# Patient Record
Sex: MALE | Race: WHITE | NOT HISPANIC OR LATINO | URBAN - METROPOLITAN AREA
[De-identification: names, ages, dates, MRNs, and addresses within clinical notes are randomized per-mention and may not be internally consistent; named-entity substitution may affect disease eponyms.]

---

## 2017-01-02 ENCOUNTER — ANESTHESIA EVENT (OUTPATIENT)
Dept: SURGERY | Facility: CLINIC | Age: 27
DRG: 407 | End: 2017-01-02

## 2017-01-02 ENCOUNTER — TELEPHONE (OUTPATIENT)
Dept: TRANSPLANT | Facility: CLINIC | Age: 27
End: 2017-01-02

## 2017-01-02 NOTE — TELEPHONE ENCOUNTER
D:  Cb Martinez is a 26 year old single white male who is approved to go forward with living liver donation surgery on Wednesday, January 4, 2017.     I:  He called me today to ask some questions about his upcoming surgery and the logistics where care givers can wait etc.  A/P:  I answered Cb's questions about care givers and the process for having his caregivers updated in the family waiting room.  He wants his girlfriend and his parents to be the designated people who are allowed to get updates about his status.  I asked him to convey this information to the pre surgical prep team.  Cb states that he is feeling very comfortable and not having any second thoughts about being a living liver donor.  He has never been through surgery before, but he states that he feels confident in the team's ability.  Cb will be in clinic tomorrow for his day minus one appointments.  He is planning to have his mom, dad, and girlfriend take turns staying with him in the hospital.  I told Cb that I will visit him in the hospital post surgery.    EMMA Adkins, [facility]  Clinical  and Independent Donor Advocate  [facility]  Pager:  [phone]  Direct:  [phone]

## 2017-01-03 ENCOUNTER — APPOINTMENT (OUTPATIENT)
Dept: TRANSPLANT | Facility: CLINIC | Age: 27
End: 2017-01-03
Attending: TRANSPLANT SURGERY

## 2017-01-03 ENCOUNTER — OFFICE VISIT (OUTPATIENT)
Dept: SURGERY | Facility: CLINIC | Age: 27
End: 2017-01-03

## 2017-01-03 ENCOUNTER — OFFICE VISIT (OUTPATIENT)
Dept: TRANSPLANT | Facility: CLINIC | Age: 27
End: 2017-01-03
Attending: TRANSPLANT SURGERY

## 2017-01-03 ENCOUNTER — TELEPHONE (OUTPATIENT)
Dept: TRANSPLANT | Facility: CLINIC | Age: 27
End: 2017-01-03

## 2017-01-03 ENCOUNTER — DOCUMENTATION ONLY (OUTPATIENT)
Dept: TRANSPLANT | Facility: CLINIC | Age: 27
End: 2017-01-03

## 2017-01-03 VITALS
DIASTOLIC BLOOD PRESSURE: 73 MMHG | WEIGHT: 163 LBS | BODY MASS INDEX: 20.92 KG/M2 | RESPIRATION RATE: 16 BRPM | HEIGHT: 74 IN | HEART RATE: 57 BPM | OXYGEN SATURATION: 98 % | SYSTOLIC BLOOD PRESSURE: 117 MMHG | TEMPERATURE: 94.6 F

## 2017-01-03 VITALS
WEIGHT: 163 LBS | RESPIRATION RATE: 16 BRPM | SYSTOLIC BLOOD PRESSURE: 121 MMHG | DIASTOLIC BLOOD PRESSURE: 72 MMHG | TEMPERATURE: 98.2 F | OXYGEN SATURATION: 98 % | BODY MASS INDEX: 20.92 KG/M2 | HEIGHT: 74 IN | HEART RATE: 61 BPM

## 2017-01-03 DIAGNOSIS — Z00.5 TRANSPLANT DONOR EVALUATION: Primary | ICD-10-CM

## 2017-01-03 DIAGNOSIS — Z52.6 DONOR FOR LIVER TRANSPLANT: Primary | ICD-10-CM

## 2017-01-03 DIAGNOSIS — Z00.5 TRANSPLANT DONOR EVALUATION: ICD-10-CM

## 2017-01-03 LAB
ABO + RH BLD: NORMAL
ABO + RH BLD: NORMAL
ALBUMIN UR-MCNC: NEGATIVE MG/DL
ANION GAP SERPL CALCULATED.3IONS-SCNC: 6 MMOL/L (ref 3–14)
APPEARANCE UR: CLEAR
APTT PPP: 32 SEC (ref 22–37)
BILIRUB UR QL STRIP: NEGATIVE
BLD GP AB SCN SERPL QL: NORMAL
BLOOD BANK CMNT PATIENT-IMP: NORMAL
BUN SERPL-MCNC: 13 MG/DL (ref 7–30)
CALCIUM SERPL-MCNC: 9.1 MG/DL (ref 8.5–10.1)
CHLORIDE SERPL-SCNC: 105 MMOL/L (ref 94–109)
CO2 SERPL-SCNC: 29 MMOL/L (ref 20–32)
COLOR UR AUTO: COLORLESS
CREAT SERPL-MCNC: 0.9 MG/DL (ref 0.66–1.25)
ERYTHROCYTE [DISTWIDTH] IN BLOOD BY AUTOMATED COUNT: 11.6 % (ref 10–15)
GFR SERPL CREATININE-BSD FRML MDRD: NORMAL ML/MIN/1.7M2
GLUCOSE SERPL-MCNC: 92 MG/DL (ref 70–99)
GLUCOSE UR STRIP-MCNC: NEGATIVE MG/DL
HCT VFR BLD AUTO: 45.3 % (ref 40–53)
HGB BLD-MCNC: 16.2 G/DL (ref 13.3–17.7)
HGB UR QL STRIP: NEGATIVE
INR PPP: 1.05 (ref 0.86–1.14)
KETONES UR STRIP-MCNC: NEGATIVE MG/DL
LEUKOCYTE ESTERASE UR QL STRIP: NEGATIVE
MCH RBC QN AUTO: 31.8 PG (ref 26.5–33)
MCHC RBC AUTO-ENTMCNC: 35.8 G/DL (ref 31.5–36.5)
MCV RBC AUTO: 89 FL (ref 78–100)
MUCOUS THREADS #/AREA URNS LPF: PRESENT /LPF
NITRATE UR QL: NEGATIVE
PH UR STRIP: 6 PH (ref 5–7)
PLATELET # BLD AUTO: 212 10E9/L (ref 150–450)
POTASSIUM SERPL-SCNC: 3.9 MMOL/L (ref 3.4–5.3)
RBC # BLD AUTO: 5.09 10E12/L (ref 4.4–5.9)
RBC #/AREA URNS AUTO: <1 /HPF (ref 0–2)
SODIUM SERPL-SCNC: 139 MMOL/L (ref 133–144)
SP GR UR STRIP: 1 (ref 1–1.03)
SPECIMEN EXP DATE BLD: NORMAL
URN SPEC COLLECT METH UR: ABNORMAL
UROBILINOGEN UR STRIP-MCNC: 0 MG/DL (ref 0–2)
WBC # BLD AUTO: 5.1 10E9/L (ref 4–11)
WBC #/AREA URNS AUTO: <1 /HPF (ref 0–2)

## 2017-01-03 PROCEDURE — 87086 URINE CULTURE/COLONY COUNT: CPT | Performed by: TRANSPLANT SURGERY

## 2017-01-03 PROCEDURE — 36415 COLL VENOUS BLD VENIPUNCTURE: CPT | Performed by: TRANSPLANT SURGERY

## 2017-01-03 PROCEDURE — 85610 PROTHROMBIN TIME: CPT | Performed by: TRANSPLANT SURGERY

## 2017-01-03 PROCEDURE — 86900 BLOOD TYPING SEROLOGIC ABO: CPT | Performed by: TRANSPLANT SURGERY

## 2017-01-03 PROCEDURE — 85027 COMPLETE CBC AUTOMATED: CPT | Performed by: TRANSPLANT SURGERY

## 2017-01-03 PROCEDURE — 80048 BASIC METABOLIC PNL TOTAL CA: CPT | Performed by: TRANSPLANT SURGERY

## 2017-01-03 PROCEDURE — 85730 THROMBOPLASTIN TIME PARTIAL: CPT | Performed by: TRANSPLANT SURGERY

## 2017-01-03 PROCEDURE — 86850 RBC ANTIBODY SCREEN: CPT | Performed by: TRANSPLANT SURGERY

## 2017-01-03 PROCEDURE — 86901 BLOOD TYPING SEROLOGIC RH(D): CPT | Performed by: TRANSPLANT SURGERY

## 2017-01-03 PROCEDURE — 81001 URINALYSIS AUTO W/SCOPE: CPT | Performed by: TRANSPLANT SURGERY

## 2017-01-03 RX ORDER — ERGOCALCIFEROL (VITAMIN D2) 10 MCG
400 TABLET ORAL PRN
COMMUNITY
End: 2017-01-20

## 2017-01-03 ASSESSMENT — PAIN SCALES - GENERAL: PAINLEVEL: NO PAIN (0)

## 2017-01-03 NOTE — H&P
Pre-Operative H & P     Date of Encounter: 1/3/2017  Primary Care Physician:  Unknown, Doctor    CC: Liver transplant donor to unrelated recipient.    HPI:  Rustam Martinez is a 26 year old male who presents for pre-operative H & P in preparation for Liver Transplant Living Unrelated Donor on 1/4/17 with Dr. Merchant at [facility].     The patient saw Dr. Merchant on 10/13/16 for evaluation as a potential liver transplant donor.  He has been fully evaluated, and his case has been reviewed by the Abdominal Transplant Committee.  He has been approved to donate, and arrangements are now being made for the above procedure.    History is obtained from the patient and the medical record.    Past Medical History:  Past Medical History   Diagnosis Date     NO ACTIVE PROBLEMS      Past Surgical History:  Past Surgical History   Procedure Laterality Date     Dental surgery       [place] teeth removal     Hx of Blood transfusions/reactions: Denies.     Hx of abnormal bleeding or anti-platelet use: Denies.    Menstrual history: No LMP for male patient.    Steroid use in the last year: Denies.    Personal or FH of difficulty with anesthesia:  Denies.    Prior to admission medications  Current Outpatient Prescriptions   Medication Sig Dispense Refill     Vitamin D, Cholecalciferol, 400 UNITS TABS Take 400 Units by mouth as needed (seasonal)       Allergies  No Known Allergies    Social History  Social History     Social History     Marital Status: Single     Spouse Name: N/A     Number of Children: N/A     Years of Education: N/A     Occupational History     Not on file.     Social History Main Topics     Smoking status: Never Smoker      Smokeless tobacco: Never Used     Alcohol Use: 0.0 oz/week     0 Standard drinks or equivalent per week      Comment: none for 1 month     Drug Use: No     Sexual Activity: Not on file     Other Topics Concern     Not on file     Social  "History Narrative    Single.  Paints houses.  Active in dancing and theater. 10/13/16     Family History  Family History   Problem Relation Age of Onset     Cirrhosis Maternal Grandfather      Review of Systems  Functional status: Independent in ADL's.  >4 METS.     The complete review of systems is negative other than noted in the HPI or here.   Constitutional: Denies recent changes in weight, sleeping patterns, or fevers/chills.  Eyes: Glasses for vision correction.  No recent vision changes.  EENT: Denies recent changes in hearing, mouth pain, or difficulty swallowing.  Cardiovascular: Denies chest pain, THOMPSON or orthopnea, or palpitations.  Respiratory: Denies shortness of breath or significant cough.    GI: Denies nausea/vomiting or diarrhea/constipation.    : Denies dysuria.    Musculoskeletal: Denies joint pain or swelling.    Skin: Denies rashes or wounds.    Hematologic: Denies easy bruising or bleeding.    Neurologic: Denies migraines, seizures, dizziness, numbness/tingling.  Psychiatric: Denies changes in mood or affect.      /72 mmHg  Pulse 61  Temp(Src) 98.2  F (36.8  C) (Oral)  Resp 16  Ht 1.88 m (6' 2\")  Wt 73.936 kg (163 lb)  BMI 20.92 kg/m2  SpO2 98%    163 lbs 0 oz  6' 2\"   Body mass index is 20.92 kg/(m^2).    Physical Exam  Constitutional: Patient awake, seated upright in a chair, in no apparent distress.  Appears stated age.  Eyes: Pupils equal, round and reactive to light.  Extra ocular muscles intact. Sclera clear.  Conjunctiva normal.  HENT: Head normocephalic.  Oral pharynx intact with moist mucous membranes.  Dentition intact.  No thyromegaly appreciated.   Respiratory: Lung sounds clear to auscultation bilaterally.  No rales, rhonchi, or wheezing noted.    Cardiovascular: S1, S2, regular rate and rhythm.  No murmurs, rubs, or gallops noted. Radial and pedal pulses palpable, bilaterally.  No edema noted.   GI: Bowel sounds present.  Abdomen flat, soft, non-tender to light " palpation.  No hepatosplenomegaly or masses palpated.    Genitourinary: Exam deferred.  Lymph/Hematologic: No cervical or supraclavicular lymphadenopathy noted.  No excessive bruising noted.    Skin: Color appropriate for race, warm, dry.  No rashes or wounds at anticipated surgical site.   Musculoskeletal: Full extension of the neck.  No redness, warmth, or swelling of the joints noted. Gross motor strength is normal.    Neurologic: Alert, oriented to name, place and time. Cranial nerves II-XII are grossly intact. Gait is normal.      Neuropsychiatric: Calm, cooperative. Normal affect.     Labs:  1/3/17: WBC 5.1; Hgb 162.; Hct 45.3; Plt 212; INR 1.05  1/3/17: Na 139; K 3.9; Cl 105; Glu 92; BUN 13; Cr 0.90; Ca 9.1    Imaging:  10/20/16 CTA Angiogram Abdomen Pelvis:  IMPRESSION:     1. Total liver volume is 1710.3 cc. The right lobe volume 972.7 cc, left lobe 708.0 cc, and caudate 29.6 cc.  2. Hepatic venous drainage: Right hepatic vein drains segments VI and VII, middle hepatic vein drains segments V, VIII, IV A, and IV B, and left hepatic vein drains segments II and III.  3. There are no accessory hepatic veins.  4. There is normal branching pattern of the portal veins.  5. Hepatic arterial anatomy: The left hepatic artery branches off of the common hepatic artery, before the origin of the gastroduodenal artery.  6. Liver parenchyma is normal.  7. Mild sludge in the gallbladder.     1/3/17 EKG: Personally reviewed and interpreted as sinus bradycardia.  Formal cardiology read pending.    1/3/17 CXR:   Impression:  No acute cardiopulmonary abnormality.    Lab results, EKG were personally reviewed by this provider.      Assessment and Plan  Rustam Martinez is a 26 year old male scheduled to undergo Liver Transplant Living Unrelated Donor on 1/4/17 with Dr. Merchant.    He has the following specific operative considerations:   The patient is a young, very healthy, physically active, non-smoking male who is  preparing to donate part of his liver.  He has had a complete work-up as part of the transplant process.  No further evaluation is needed.    Revised Cardiac Risk Index: 0.9% risk of major adverse cardiac event.  Anesthesia considerations: Refer to PAC assessment in the anesthesia records.  VTE risk: 0.5%  GLORIA risk: Low  PONV risk score= 1.  (If > 2, anti-emetic intervention is recommended.)    Romina Flores NP  Preoperative Assessment Center  [facility]  Phone: [phone]  Fax: [phone]

## 2017-01-03 NOTE — Clinical Note
1/3/2017       RE: Rustam Martinez  [address]  [facility] [address]     Dear Colleague,    Thank you for referring your patient, Rustam Martinez, to the [facility] SOLID ORGAN TRANSPLANT at [facility]. Please see a copy of my visit note below.        Pre Op Teaching Flowsheet       Pre and Post op Patient Education  Relevant Diagnosis:  Day -1 liver donor  Teaching Topic:  Pre and post op teaching  Person Involved in teaching:  Rustam Martinez     Motivation Level:  Asks Questions: Yes  Eager to Learn:  Yes  Cooperative: Yes  Receptive (willing/able to accept information):  Yes  Patient demonstrates understanding of the following:  Date and time of surgery:  1/04/2017  Location of surgery:  [address] [place] [address]  History and Physical and any other testing necessary prior to surgery: Yes  Required time line for completion of History and Physical and any pre-op testing: Yes    NPO Guidelines: NPO after midnight    Patient demonstrates understanding of the following:  Pre-op bowel prep:  Yes  Pre-op showering/scrub information with PCMX Soap: Yes  Medications to take the day of surgery:  Per PCP  Blood thinner medications discussed and when to stop (if applicable):  n/a  Diabetes medication management (if applicable):  N/A  Discussed pain control after surgery: pain medications and PCA pump  Infection Prevention: Patient demonstrates understanding of the following:  Patient instructed on hand hygiene:  Yes  Surgical procedure site care taught: at time of discharge  Signs and symptoms of infection taught:  Yes  Wound care will be taught at the time of discharge.  Central venous catheter care will be taught at the time of discharge (if applicable).    Post-op follow-up:  Discussed how to contact the hospital, nurse, and clinic scheduling staff if necessary.    Instructional materials used/given/mailed:  [facility] Booklet, post op teaching sheet, Map, Soap, and  arrival/location information.    Surgical instructions mailed to patient N/A.    Patient attended all appointments and completed all scheduled tests.  Patient to follow up with Transplant Coordinator.  Patient stated understanding and has contact numbers.            Again, thank you for allowing me to participate in the care of your patient.      Sincerely,    Transplant Evaluation Resource

## 2017-01-03 NOTE — ADDENDUM NOTE
Addendum  created 01/03/17 1736 by Diana Ko MD    Modules edited: Notes Section    Notes Section:  File: [id number]

## 2017-01-03 NOTE — TELEPHONE ENCOUNTER
Tyler Cody,  I m looking forward to the 4th and I have a few questions that either a) I have or b) people have been asking me that I don t know how to answer. So here goes:  --I m really hoping that I can get copies of all of my medical papers, the EKGs, the MRI, the X-rays, all the charts and bits and pieces of paper that have been collected about me regarding liver donation thus far. My little brother is an artist, and wants to include some of the material in a piece he ll be presenting in [place] this coming Spring. Is there any way that can happen?  --I have the schedule, but I m a little lost when it comes to telling my parents and loved ones when they should be there, and what they can expect at what time. Like, when I actually get put under, is it appropriate for my parents to be there? What time does that happen? Or do loved ones usually go to the testing and teaching fest on the 3rd? The Michelle hasn't spent much time in hospitals, so we don't really know what we're doing.  --My partner will be taking shifts as a caretaker for me, both in the hospital and after. I d really like to make sure she has the proper rights for medical disclosure or whatever it takes to make her an effective part of the team. What sort of information do you need to make this happen? Is it as simple as adding her name to a list or do you need something more?  --How should I talk to people about visiting me in the hospital? I ve never needed to be in a hospital for any length of time, so I have no idea what to say to people.  --My cousin is a Nurse Practitioner down in [place] and asked if she could watch (part of) the operation. Could that happen? What can I do to make it happen? I have no idea what the proper channels are for something like this, but if it will help for someone to correspond with her directly I will happily provide her contact information. I m sorry that this is so last minute, but we only just saw her as we were down  there for the holidays and it never even occurred to me before then.  I don't know if you have all the answers to all these questions--if some other member of the team is better suited to answer some of these, feel free to tell me who and I can contact them myself.  I think that s it for now. Jeison and I have been meeting and getting to know one another. We re both excited and ready for the 4th. Hope you re having an awesome day.

## 2017-01-03 NOTE — PROGRESS NOTES
Pre Op Teaching Flowsheet       Pre and Post op Patient Education  Relevant Diagnosis:  Day -1 liver donor  Teaching Topic:  Pre and post op teaching  Person Involved in teaching:  Rustam Martinez     Motivation Level:  Asks Questions: Yes  Eager to Learn:  Yes  Cooperative: Yes  Receptive (willing/able to accept information):  Yes  Patient demonstrates understanding of the following:  Date and time of surgery:  1/04/2017  Location of surgery:  [address] [place] [address]  History and Physical and any other testing necessary prior to surgery: Yes  Required time line for completion of History and Physical and any pre-op testing: Yes    NPO Guidelines: NPO after midnight    Patient demonstrates understanding of the following:  Pre-op bowel prep:  Yes  Pre-op showering/scrub information with PCMX Soap: Yes  Medications to take the day of surgery:  Per PCP  Blood thinner medications discussed and when to stop (if applicable):  n/a  Diabetes medication management (if applicable):  N/A  Discussed pain control after surgery: pain medications and PCA pump  Infection Prevention: Patient demonstrates understanding of the following:  Patient instructed on hand hygiene:  Yes  Surgical procedure site care taught: at time of discharge  Signs and symptoms of infection taught:  Yes  Wound care will be taught at the time of discharge.  Central venous catheter care will be taught at the time of discharge (if applicable).    Post-op follow-up:  Discussed how to contact the hospital, nurse, and clinic scheduling staff if necessary.    Instructional materials used/given/mailed:  [facility] Booklet, post op teaching sheet, Map, Soap, and arrival/location information.    Surgical instructions mailed to patient N/A.    Patient attended all appointments and completed all scheduled tests.  Patient to follow up with Transplant Coordinator.  Patient stated understanding and has contact numbers.

## 2017-01-03 NOTE — PROGRESS NOTES
Saw Cb in clinic for pre-op.  Surgery is scheduled for 1-4-17.  I reviewed expectations for day of surgery.  I thanked donor for their gift. I reviewed the importance of donor follow up.   I gave the donor parking passes and donor blanket.  I answered all questions.

## 2017-01-03 NOTE — Clinical Note
1/3/2017       RE: Rustam Martinez  [address]  [facility] [address]     Dear Colleague,    Thank you for referring your patient, Rustam Martinez, to the [facility] SOLID ORGAN TRANSPLANT at [facility]. Please see a copy of my visit note below.    [facility] H&P Transplant Note    Rustam Martinez MRN# [id number]   Age: 26 year old YOB: 1990     Date of Admission:  (Not on file)                    Assessment and Plan:   Assessment:    Patient Active Problem List   Diagnosis     NO ACTIVE PROBLEMS       Patient is seen here prior to living donor hepatectomy  Plan:   Living donor right hepatectomy  Informed consent in chart  Orders done in EPIC             Chief Complaint:   No complaints- healthy living donor liver     History is obtained from the patient         History of Present Illness:   This patient is a 26 year old  male without a significant past medical history who is seen in the clinic prior to living donor right hepatectomy for donation to a friend               Past Medical History:     Past Medical History   Diagnosis Date     NO ACTIVE PROBLEMS              Past Surgical History:     Past Surgical History   Procedure Laterality Date     No history of surgery               Social History:     Social History   Substance Use Topics     Smoking status: Never Smoker      Smokeless tobacco: Never Used      Comment: only a one time ciggerate     Alcohol Use: 0.0 oz/week     0 Standard drinks or equivalent per week             Family History:     Family History   Problem Relation Age of Onset     Cirrhosis Maternal Grandfather      Family history reviewed and updated in EPIC         Allergies:   This patient is allergic to has No Known Allergies.          Medications:     No current outpatient prescriptions on file.     No current facility-administered medications for this visit.           Immunosuppressant Medications:     "none        Reasons for Consult:   Evaluation for liver transplation         Organs Lab:   Hepatic Panel and INR         Review of Systems:   C: NEGATIVE for fever, chills, change in weight  E/M: NEGATIVE for ear, mouth and throat problems  R: NEGATIVE for significant cough or SOB  CV: NEGATIVE for chest pain, palpitations or peripheral edema  GI: NEGATIVE for nausea, abdominal pain, heartburn, or change in bowel habits  : negative for dysuria, hematuria, decreased urinary stream, erectile dysfunction  MUSCULOSKELETAL: NEGATIVE for significant arthralgias or myalgia  ROS otherwise negative          Physical Exam:   Vitals were reviewed                     Constitutional:   awake, alert, cooperative, no apparent distress, and appears stated age     Lungs:   No increased work of breathing, good air exchange, clear to auscultation bilaterally, no crackles or wheezing     Cardiovascular:   Normal apical impulse, regular rate and rhythm, normal S1 and S2, no S3 or S4, and no murmur noted     Abdomen:   No scars, normal bowel sounds, soft, non-distended, non-tender, no masses palpated, no hepatosplenomegaly, no ascites, narrow costal angle     Musculoskeletal:   There is no redness, warmth, or swelling of the joints.  Full range of motion noted.  Motor strength is 5 out of 5 all extremities bilaterally.  Tone is normal.     Ht- 6'2\"  Wt- 73 kg  BMI-21    Ct angio- normal hepatic artery branches  MRI- normal bile duct anatomy  Patent portal vein  Right lobe volume-972 cc total volume - 1710 cc.         Data:   All laboratory data reviewed  All cardiac studies reviewed by me.  All imaging studies reviewed by me.     Ana Haney M.D,   Transplant Fellow,  Pager # [phone].              Again, thank you for allowing me to participate in the care of your patient.      Sincerely,    Jose Merchant MD      "

## 2017-01-03 NOTE — Clinical Note
Date:January 9, 2017      Patient was self referred, no letter generated. Do not send.        [facility] Information

## 2017-01-03 NOTE — PROGRESS NOTES
[facility] H&P Transplant Note    Rustam Martinez MRN# [id number]   Age: 26 year old YOB: 1990     Date of Admission:  (Not on file)                    Assessment and Plan:   Assessment:    Patient Active Problem List   Diagnosis     NO ACTIVE PROBLEMS       Patient is seen here prior to living donor hepatectomy  Plan:   Living donor right hepatectomy  Informed consent in chart  Orders done in EPIC             Chief Complaint:   No complaints- healthy living donor liver     History is obtained from the patient         History of Present Illness:   This patient is a 26 year old  male without a significant past medical history who is seen in the clinic prior to living donor right hepatectomy for donation to a friend               Past Medical History:     Past Medical History   Diagnosis Date     NO ACTIVE PROBLEMS              Past Surgical History:     Past Surgical History   Procedure Laterality Date     No history of surgery               Social History:     Social History   Substance Use Topics     Smoking status: Never Smoker      Smokeless tobacco: Never Used      Comment: only a one time ciggerate     Alcohol Use: 0.0 oz/week     0 Standard drinks or equivalent per week             Family History:     Family History   Problem Relation Age of Onset     Cirrhosis Maternal Grandfather      Family history reviewed and updated in EPIC         Allergies:   This patient is allergic to has No Known Allergies.          Medications:     No current outpatient prescriptions on file.     No current facility-administered medications for this visit.           Immunosuppressant Medications:    none        Reasons for Consult:   Evaluation for liver transplation         Organs Lab:   Hepatic Panel and INR         Review of Systems:   C: NEGATIVE for fever, chills, change in weight  E/M: NEGATIVE for ear, mouth and throat problems  R: NEGATIVE for significant cough or SOB  CV: NEGATIVE for  "chest pain, palpitations or peripheral edema  GI: NEGATIVE for nausea, abdominal pain, heartburn, or change in bowel habits  : negative for dysuria, hematuria, decreased urinary stream, erectile dysfunction  MUSCULOSKELETAL: NEGATIVE for significant arthralgias or myalgia  ROS otherwise negative          Physical Exam:   Vitals were reviewed                     Constitutional:   awake, alert, cooperative, no apparent distress, and appears stated age     Lungs:   No increased work of breathing, good air exchange, clear to auscultation bilaterally, no crackles or wheezing     Cardiovascular:   Normal apical impulse, regular rate and rhythm, normal S1 and S2, no S3 or S4, and no murmur noted     Abdomen:   No scars, normal bowel sounds, soft, non-distended, non-tender, no masses palpated, no hepatosplenomegaly, no ascites, narrow costal angle     Musculoskeletal:   There is no redness, warmth, or swelling of the joints.  Full range of motion noted.  Motor strength is 5 out of 5 all extremities bilaterally.  Tone is normal.     Ht- 6'2\"  Wt- 73 kg  BMI-21    Ct angio- normal hepatic artery branches  MRI- normal bile duct anatomy  Patent portal vein  Right lobe volume-972 cc total volume - 1710 cc.         Data:   All laboratory data reviewed  All cardiac studies reviewed by me.  All imaging studies reviewed by me.     Ana Haney M.D,   Transplant Fellow,  Pager # [phone].          "

## 2017-01-03 NOTE — ANESTHESIA PREPROCEDURE EVALUATION
Anesthesia Evaluation     . Pt has had prior anesthetic. Type: MAC    No history of anesthetic complications     ROS/MED HX    ENT/Pulmonary:  - neg pulmonary ROS     Neurologic:  - neg neurologic ROS     Cardiovascular:  - neg cardiovascular ROS       METS/Exercise Tolerance:  >4 METS   Hematologic:  - neg hematologic  ROS       Musculoskeletal:  - neg musculoskeletal ROS       GI/Hepatic:  - neg GI/hepatic ROS       Renal/Genitourinary:  - ROS Renal section negative       Endo:  - neg endo ROS       Psychiatric: Comment: Depression is not currently an issue - neg psychiatric ROS   (+) psychiatric history depression      Infectious Disease:  - neg infectious disease ROS       Malignancy:      - no malignancy   Other:    - neg other ROS           Physical Exam  Normal systems: dental    Airway   Mallampati: I  TM distance: >3 FB  Neck ROM: full    Dental     Cardiovascular   Rhythm and rate: regular and normal  (-) no peripheral edema    Pulmonary    breath sounds clear to auscultation    Other findings: 1/3/17: WBC 5.1; Hgb 162.; Hct 45.3; Plt 212; INR 1.05  1/3/17: Na 139; K 3.9; Cl 105; Glu 92; BUN 13; Cr 0.90; Ca 9.1    10/20/16 CTA Angiogram Abdomen Pelvis:  IMPRESSION:     1. Total liver volume is 1710.3 cc. The right lobe volume 972.7 cc, left lobe 708.0 cc, and caudate 29.6 cc.  2. Hepatic venous drainage: Right hepatic vein drains segments VI and VII, middle hepatic vein drains segments V, VIII, IV A, and IV B, and left hepatic vein drains segments II and III.  3. There are no accessory hepatic veins.  4. There is normal branching pattern of the portal veins.  5. Hepatic arterial anatomy: The left hepatic artery branches off of the common hepatic artery, before the origin of the gastroduodenal artery.  6. Liver parenchyma is normal.  7. Mild sludge in the gallbladder.     1/3/17 EKG: Personally reviewed and interpreted as sinus bradycardia.  Formal cardiology read pending.    1/3/17 CXR:   Impression:  No  acute cardiopulmonary abnormality.             PAC Discussion and Assessment    ASA Classification: 1  Case is suitable for: [facility]  Anesthetic techniques and relevant risks discussed: GA and GA with regional block for post-op pain control  Invasive monitoring and risk discussed: Yes  Types:   Possibility and Risk of blood transfusion discussed: Yes  NPO instructions given:   Additional anesthetic preparation and risks discussed:   Needs early admission to pre-op area:   Other:     PAC Resident/NP Anesthesia Assessment:  Rustam Martinez is a 26 year old male scheduled to undergo Liver Transplant Living Unrelated Donor on 1/4/17 with Dr. Merchant.    He has the following specific operative considerations:   The patient is a young, very healthy, physically active, non-smoking male who is preparing to donate part of his liver.  He has had a complete work-up as part of the transplant process.  No further evaluation is needed.    Revised Cardiac Risk Index: 0.9% risk of major adverse cardiac event.  Anesthesia considerations: Refer to PAC assessment in the anesthesia records.  VTE risk: 0.5%  GLORIA risk: Low  PONV risk score= 1.  (If > 2, anti-emetic intervention is recommended.)      Reviewed and Signed by PAC Mid-Level Provider/Resident  Mid-Level Provider/Resident: Romina Flores CNP  Date: 1/3/17  Time: 1550    Attending Anesthesiologist Anesthesia Assessment:  Healthy 26 year old for partial hepatic donation. Patient/case discussed with NARDA. No need to see patient. Patient is appropriate for the planned procedure without further work-up or medical management.    Reviewed and Signed by PAC Anesthesiologist  Anesthesiologist: Diana Ko MD  Date: 1/3/2017  Time:   Pass/Fail: Pass  Disposition:     PAC Pharmacist Assessment:        Pharmacist:   Date:   Time:      Anesthesia Plan      History & Physical Review      ASA Status:  1 .        Plan for General, ETT and Periph. Nerve Block for postop pain with  Intravenous and Propofol induction. Maintenance will be Balanced.    PONV prophylaxis:  Ondansetron (or other 5HT-3)  Additional equipment: 2nd IV and Central Line      Postoperative Care  Postoperative pain management:  Multi-modal analgesia.      Consents                          .

## 2017-01-04 ENCOUNTER — ANESTHESIA (OUTPATIENT)
Dept: SURGERY | Facility: CLINIC | Age: 27
DRG: 407 | End: 2017-01-04

## 2017-01-04 ENCOUNTER — APPOINTMENT (OUTPATIENT)
Dept: GENERAL RADIOLOGY | Facility: CLINIC | Age: 27
DRG: 407 | End: 2017-01-04
Attending: TRANSPLANT SURGERY

## 2017-01-04 ENCOUNTER — APPOINTMENT (OUTPATIENT)
Dept: GENERAL RADIOLOGY | Facility: CLINIC | Age: 27
DRG: 407 | End: 2017-01-04
Attending: ANESTHESIOLOGY

## 2017-01-04 ENCOUNTER — APPOINTMENT (OUTPATIENT)
Dept: ULTRASOUND IMAGING | Facility: CLINIC | Age: 27
DRG: 407 | End: 2017-01-04
Attending: SURGERY

## 2017-01-04 PROBLEM — Z52.6 DONOR FOR LIVER TRANSPLANT: Status: ACTIVE | Noted: 2017-01-04

## 2017-01-04 LAB
BACTERIA SPEC CULT: NO GROWTH
INTERPRETATION ECG - MUSE: NORMAL
Lab: NORMAL
MICRO REPORT STATUS: NORMAL
SPECIMEN SOURCE: NORMAL

## 2017-01-04 PROCEDURE — 40000940 XR ABDOMEN PORT F1 VW

## 2017-01-04 PROCEDURE — 27110038 ZZH RX 271: Performed by: ANESTHESIOLOGY

## 2017-01-04 PROCEDURE — P9041 ALBUMIN (HUMAN),5%, 50ML: HCPCS | Performed by: NURSE ANESTHETIST, CERTIFIED REGISTERED

## 2017-01-04 PROCEDURE — 25000125 ZZHC RX 250: Performed by: NURSE ANESTHETIST, CERTIFIED REGISTERED

## 2017-01-04 PROCEDURE — 25800025 ZZH RX 258: Performed by: ANESTHESIOLOGY

## 2017-01-04 PROCEDURE — 25000125 ZZHC RX 250: Performed by: SURGERY

## 2017-01-04 PROCEDURE — C9399 UNCLASSIFIED DRUGS OR BIOLOG: HCPCS | Performed by: NURSE ANESTHETIST, CERTIFIED REGISTERED

## 2017-01-04 PROCEDURE — 40000940 XR CHEST PORT 1 VW

## 2017-01-04 PROCEDURE — 40000277 XR SURGERY CARM FLUORO LESS THAN 5 MIN W STILLS: Mod: TC

## 2017-01-04 PROCEDURE — 25000128 H RX IP 250 OP 636: Performed by: NURSE ANESTHETIST, CERTIFIED REGISTERED

## 2017-01-04 PROCEDURE — 25000125 ZZHC RX 250

## 2017-01-04 PROCEDURE — 25000125 ZZHC RX 250: Performed by: ANESTHESIOLOGY

## 2017-01-04 PROCEDURE — 93975 VASCULAR STUDY: CPT | Mod: TC

## 2017-01-04 RX ORDER — LIDOCAINE HYDROCHLORIDE AND EPINEPHRINE 15; 5 MG/ML; UG/ML
INJECTION, SOLUTION EPIDURAL PRN
Status: DISCONTINUED | OUTPATIENT
Start: 2017-01-04 | End: 2017-01-04

## 2017-01-04 RX ORDER — ESMOLOL HYDROCHLORIDE 10 MG/ML
INJECTION INTRAVENOUS PRN
Status: DISCONTINUED | OUTPATIENT
Start: 2017-01-04 | End: 2017-01-04

## 2017-01-04 RX ORDER — ALBUMIN, HUMAN INJ 5% 5 %
SOLUTION INTRAVENOUS CONTINUOUS PRN
Status: DISCONTINUED | OUTPATIENT
Start: 2017-01-04 | End: 2017-01-04

## 2017-01-04 RX ORDER — PROPOFOL 10 MG/ML
INJECTION, EMULSION INTRAVENOUS PRN
Status: DISCONTINUED | OUTPATIENT
Start: 2017-01-04 | End: 2017-01-04

## 2017-01-04 RX ORDER — HEPARIN SODIUM 1000 [USP'U]/ML
INJECTION, SOLUTION INTRAVENOUS; SUBCUTANEOUS PRN
Status: DISCONTINUED | OUTPATIENT
Start: 2017-01-04 | End: 2017-01-04

## 2017-01-04 RX ORDER — FENTANYL CITRATE 50 UG/ML
INJECTION, SOLUTION INTRAMUSCULAR; INTRAVENOUS PRN
Status: DISCONTINUED | OUTPATIENT
Start: 2017-01-04 | End: 2017-01-04

## 2017-01-04 RX ORDER — SODIUM CHLORIDE, SODIUM LACTATE, POTASSIUM CHLORIDE, CALCIUM CHLORIDE 600; 310; 30; 20 MG/100ML; MG/100ML; MG/100ML; MG/100ML
INJECTION, SOLUTION INTRAVENOUS CONTINUOUS PRN
Status: DISCONTINUED | OUTPATIENT
Start: 2017-01-04 | End: 2017-01-04

## 2017-01-04 RX ORDER — LIDOCAINE HYDROCHLORIDE 20 MG/ML
INJECTION, SOLUTION INFILTRATION; PERINEURAL PRN
Status: DISCONTINUED | OUTPATIENT
Start: 2017-01-04 | End: 2017-01-04

## 2017-01-04 RX ORDER — ONDANSETRON 2 MG/ML
INJECTION INTRAMUSCULAR; INTRAVENOUS PRN
Status: DISCONTINUED | OUTPATIENT
Start: 2017-01-04 | End: 2017-01-04

## 2017-01-04 RX ADMIN — FENTANYL CITRATE 50 MCG: 50 INJECTION, SOLUTION INTRAMUSCULAR; INTRAVENOUS at 16:25

## 2017-01-04 RX ADMIN — ROCURONIUM BROMIDE 50 MG: 10 INJECTION INTRAVENOUS at 08:23

## 2017-01-04 RX ADMIN — SODIUM CHLORIDE, POTASSIUM CHLORIDE, SODIUM LACTATE AND CALCIUM CHLORIDE: 600; 310; 30; 20 INJECTION, SOLUTION INTRAVENOUS at 08:15

## 2017-01-04 RX ADMIN — ESMOLOL HYDROCHLORIDE 40 MG: 10 INJECTION, SOLUTION INTRAVENOUS at 16:25

## 2017-01-04 RX ADMIN — SODIUM CHLORIDE, POTASSIUM CHLORIDE, SODIUM LACTATE AND CALCIUM CHLORIDE: 600; 310; 30; 20 INJECTION, SOLUTION INTRAVENOUS at 07:19

## 2017-01-04 RX ADMIN — FENTANYL CITRATE 100 MCG: 50 INJECTION, SOLUTION INTRAMUSCULAR; INTRAVENOUS at 14:39

## 2017-01-04 RX ADMIN — FENTANYL CITRATE 100 MCG: 50 INJECTION, SOLUTION INTRAMUSCULAR; INTRAVENOUS at 12:02

## 2017-01-04 RX ADMIN — BUPIVACAINE HYDROCHLORIDE 5 ML: 2.5 INJECTION, SOLUTION EPIDURAL; INFILTRATION; INTRACAUDAL; PERINEURAL at 08:27

## 2017-01-04 RX ADMIN — ONDANSETRON 4 MG: 2 INJECTION INTRAMUSCULAR; INTRAVENOUS at 14:55

## 2017-01-04 RX ADMIN — LIDOCAINE HYDROCHLORIDE 80 MG: 20 INJECTION, SOLUTION INFILTRATION; PERINEURAL at 07:50

## 2017-01-04 RX ADMIN — ROCURONIUM BROMIDE 30 MG: 10 INJECTION INTRAVENOUS at 12:01

## 2017-01-04 RX ADMIN — ROCURONIUM BROMIDE 30 MG: 10 INJECTION INTRAVENOUS at 12:39

## 2017-01-04 RX ADMIN — ROCURONIUM BROMIDE 10 MG: 10 INJECTION INTRAVENOUS at 13:30

## 2017-01-04 RX ADMIN — Medication 10 ML/HR: at 09:34

## 2017-01-04 RX ADMIN — FENTANYL CITRATE 100 MCG: 50 INJECTION, SOLUTION INTRAMUSCULAR; INTRAVENOUS at 09:42

## 2017-01-04 RX ADMIN — SUGAMMADEX 150 MG: 100 INJECTION, SOLUTION INTRAVENOUS at 15:22

## 2017-01-04 RX ADMIN — FENTANYL CITRATE 50 MCG: 50 INJECTION, SOLUTION INTRAMUSCULAR; INTRAVENOUS at 14:51

## 2017-01-04 RX ADMIN — Medication 5000 UNITS: at 13:32

## 2017-01-04 RX ADMIN — HYDROMORPHONE HYDROCHLORIDE 0.5 MG: 1 INJECTION, SOLUTION INTRAMUSCULAR; INTRAVENOUS; SUBCUTANEOUS at 16:21

## 2017-01-04 RX ADMIN — ROCURONIUM BROMIDE 100 MG: 10 INJECTION INTRAVENOUS at 07:50

## 2017-01-04 RX ADMIN — FENTANYL CITRATE 100 MCG: 50 INJECTION, SOLUTION INTRAMUSCULAR; INTRAVENOUS at 12:36

## 2017-01-04 RX ADMIN — ESMOLOL HYDROCHLORIDE 30 MG: 10 INJECTION, SOLUTION INTRAVENOUS at 16:12

## 2017-01-04 RX ADMIN — MIDAZOLAM 2 MG: 1 INJECTION INTRAMUSCULAR; INTRAVENOUS at 07:19

## 2017-01-04 RX ADMIN — BUPIVACAINE HYDROCHLORIDE 5 ML: 2.5 INJECTION, SOLUTION EPIDURAL; INFILTRATION; INTRACAUDAL; PERINEURAL at 14:57

## 2017-01-04 RX ADMIN — ESMOLOL HYDROCHLORIDE 20 MG: 10 INJECTION, SOLUTION INTRAVENOUS at 16:09

## 2017-01-04 RX ADMIN — LIDOCAINE HYDROCHLORIDE,EPINEPHRINE BITARTRATE 3 ML: 15; .005 INJECTION, SOLUTION EPIDURAL; INFILTRATION; INTRACAUDAL; PERINEURAL at 06:56

## 2017-01-04 RX ADMIN — PHENYLEPHRINE HYDROCHLORIDE 100 MCG: 10 INJECTION, SOLUTION INTRAMUSCULAR; INTRAVENOUS; SUBCUTANEOUS at 08:38

## 2017-01-04 RX ADMIN — SODIUM CHLORIDE, POTASSIUM CHLORIDE, SODIUM LACTATE AND CALCIUM CHLORIDE: 600; 310; 30; 20 INJECTION, SOLUTION INTRAVENOUS at 15:11

## 2017-01-04 RX ADMIN — ROCURONIUM BROMIDE 50 MG: 10 INJECTION INTRAVENOUS at 09:38

## 2017-01-04 RX ADMIN — ALBUMIN (HUMAN): 12.5 SOLUTION INTRAVENOUS at 14:50

## 2017-01-04 RX ADMIN — ESMOLOL HYDROCHLORIDE 10 MG: 10 INJECTION, SOLUTION INTRAVENOUS at 16:20

## 2017-01-04 RX ADMIN — FENTANYL CITRATE 50 MCG: 50 INJECTION, SOLUTION INTRAMUSCULAR; INTRAVENOUS at 14:41

## 2017-01-04 RX ADMIN — FENTANYL CITRATE 50 MCG: 50 INJECTION, SOLUTION INTRAMUSCULAR; INTRAVENOUS at 07:50

## 2017-01-04 RX ADMIN — FENTANYL CITRATE 50 MCG: 50 INJECTION, SOLUTION INTRAMUSCULAR; INTRAVENOUS at 14:56

## 2017-01-04 RX ADMIN — FENTANYL CITRATE 50 MCG: 50 INJECTION, SOLUTION INTRAMUSCULAR; INTRAVENOUS at 12:15

## 2017-01-04 RX ADMIN — HYDROMORPHONE HYDROCHLORIDE 0.5 MG: 1 INJECTION, SOLUTION INTRAMUSCULAR; INTRAVENOUS; SUBCUTANEOUS at 14:56

## 2017-01-04 RX ADMIN — ROCURONIUM BROMIDE 50 MG: 10 INJECTION INTRAVENOUS at 08:48

## 2017-01-04 RX ADMIN — SODIUM CHLORIDE, POTASSIUM CHLORIDE, SODIUM LACTATE AND CALCIUM CHLORIDE: 600; 310; 30; 20 INJECTION, SOLUTION INTRAVENOUS at 12:09

## 2017-01-04 RX ADMIN — AMPICILLIN SODIUM AND SULBACTAM SODIUM 1.5 G: 2; 1 INJECTION, POWDER, FOR SOLUTION INTRAMUSCULAR; INTRAVENOUS at 12:10

## 2017-01-04 RX ADMIN — ROCURONIUM BROMIDE 20 MG: 10 INJECTION INTRAVENOUS at 11:05

## 2017-01-04 RX ADMIN — PROPOFOL 150 MG: 10 INJECTION, EMULSION INTRAVENOUS at 07:50

## 2017-01-04 RX ADMIN — AMPICILLIN SODIUM AND SULBACTAM SODIUM 3 G: 2; 1 INJECTION, POWDER, FOR SOLUTION INTRAMUSCULAR; INTRAVENOUS at 08:14

## 2017-01-04 RX ADMIN — AMPICILLIN SODIUM AND SULBACTAM SODIUM 1.5 G: 2; 1 INJECTION, POWDER, FOR SOLUTION INTRAMUSCULAR; INTRAVENOUS at 14:16

## 2017-01-04 RX ADMIN — ROCURONIUM BROMIDE 10 MG: 10 INJECTION INTRAVENOUS at 14:10

## 2017-01-04 RX ADMIN — ROCURONIUM BROMIDE 20 MG: 10 INJECTION INTRAVENOUS at 14:42

## 2017-01-04 RX ADMIN — AMPICILLIN SODIUM AND SULBACTAM SODIUM 1.5 G: 2; 1 INJECTION, POWDER, FOR SOLUTION INTRAMUSCULAR; INTRAVENOUS at 10:13

## 2017-01-04 NOTE — ANESTHESIA POSTPROCEDURE EVALUATION
Patient: Rustam Martinez    DONOR LIVER LIVING UNRELATED (N/A Abdomen)  Additional InformationProcedure(s):  Liver Transplant Living Unrelated Donor Anesthesia General with Block     Diagnosis:Donor   Diagnosis Additional Information: No value filed.    Anesthesia Type:  General, ETT, Periph. Nerve Block for postop pain    Note:  Anesthesia Post Evaluation    Patient location during evaluation: PACU  Patient participation: Able to fully participate in evaluation  Level of consciousness: awake and alert  Pain management: adequate  Airway patency: patent  Cardiovascular status: acceptable  Respiratory status: acceptable  Hydration status: acceptable  PONV: none     Anesthetic complications: None          Last vitals:  Filed Vitals:    01/04/17 0655 01/04/17 0700 01/04/17 0705   BP: 121/67 130/73 133/71   Pulse:      Temp:      Resp: 16 13 15   SpO2: 100% 100% 100%       Electronically Signed By: Moises Fulton MD  January 4, 2017  4:35 PM

## 2017-01-04 NOTE — ANESTHESIA CARE TRANSFER NOTE
Patient: Rustam Martinez    DONOR LIVER LIVING UNRELATED (N/A Abdomen)  Additional InformationProcedure(s):  Liver Transplant Living Unrelated Donor Anesthesia General with Block     Diagnosis: Donor   Diagnosis Additional Information: No value filed.    Anesthesia Type:   General, ETT, Periph. Nerve Block for postop pain     Note:  Airway :Face Mask  Patient transferred to:PACU        Vitals: (Last set prior to Anesthesia Care Transfer)              Electronically Signed By: ZOILA Casas CRNA  January 4, 2017  4:31 PM

## 2017-01-04 NOTE — ANESTHESIA PROCEDURE NOTES
Peripheral Nerve Block Procedure Note    Staff:     Anesthesiologist:  COLLEEN HARVEY    Resident/CRNA:  BIGG CASTAÑEDA    Block performed by resident/CRNA in the presence of a teaching physician    Location: Pre-op  Procedure Start/Stop TImes:      1/4/2017 6:50 AM     1/4/2017 7:00 AM    patient identified, IV checked, site marked, risks and benefits discussed, informed consent, monitors and equipment checked, pre-op evaluation, at physician/surgeon's request and post-op pain management      Correct Patient: Yes      Correct Position: Yes      Correct Site: Yes      Correct Procedure: Yes      Correct Laterality:  Yes    Site Marked:  Yes  Procedure details:     Procedure:  Paravertebral    ASA:  1    Laterality:  Right    Position:  Prone    Sterile Prep: chloraprep, patient draped, mask and sterile gloves      Local skin infiltration:  1% lidocaine    amount (mL):  2    Insertion Site:  T8-9    Needle:  Touhy needle    Needle gauge:  17    Needle length (inches):  3.5    Catheter gauge:  19    Catheter threaded easily: Yes      Ultrasound: Yes      Ultrasound used to identify targeted nerve, plexus, or vascular structure and placed a needle adjacent to it      Permanent Image entered into patiient's record      Abnormal pain on injection: No      Blood Aspirated: No      Paresthesias:  No    Bleeding at site: No      Test dose local:   Lidocaine 1.5% w/ 1:200,000 epinephrine    Test dose time:  06:56    Test dose negative for signs of intravascular injection: Yes      Bolus via:  Needle    Infusion Method:  Single Shot    Blood aspirated via catheter: No      Secured:  Dermabond and Tegaderm    Complications:  None  Assessment/Narrative:     Injection made incrementally with aspirations every (mL):  1    Arterial Line Procedure Note  Staff:     Anesthesiologist:  BREN ASIF    Resident/CRNA:  ANGEL LEMUS    Arterial line performed by resident/CRNA in presence of a teaching physician     Location: In OR After Induction  Procedure Start/Stop Times:     patient identified, IV checked, site marked, risks and benefits discussed, informed consent, monitors and equipment checked, pre-op evaluation and at physician/surgeon's request      Correct Patient: Yes      Correct Position: Yes      Correct Site: Yes      Correct Procedure: Yes      Correct Laterality:  Yes    Site Marked:  Yes  Line Placement:     Procedure:  Arterial Line    Insertion Site:  Radial    Insertion laterality:  Left    Skin Prep: Chloraprep      Patient Prep: patient draped, mask, sterile gloves, sterile gown, hat and hand hygiene      Local skin infiltration:  None    Ultrasound Guided?: No      Catheter size:  20 gauge, Quick cath    Dressing:  Tegaderm    Complications:  None obvious    Arterial waveform: Yes      IBP within 10% of NIBP: Yes      Central Line Procedure Note  Staff:     Anesthesiologist:  BREN ASIF    Resident/CRNA:  ANGEL LEMUS    Central line placed by Resident/CRNA in the presence of a teaching physician    Location: In OR after induction  Procedure Start/Stop Times:     patient identified, IV checked, site marked, risks and benefits discussed, informed consent, monitors and equipment checked, pre-op evaluation and at physician/surgeon's request      Correct Patient: Yes      Correct Position: Yes      Correct Site: Yes      Correct Procedure: Yes      Correct Laterality:  Yes    Site Marked:  Yes  Line Placement:     Procedure:  Central Line    Insertion laterality:  Right    Insertion site:  Internal Jugular    Position:  Trendelenburg      Maximal Sterile Barriers: All elements of maximal sterile barrier technique followed      (Maximal sterile barriers include:   Sterile gown, Sterile Gloves, Mask, Cap, Whole body draped, hand hygiene and acceptable skin prep).Skin Prep: Chloraprep         Injection Technique:  Seldinger Technique    Local skin infiltration:  None    Catheter size:  12 Fr, 3  lumen, 20 cm    Catheter length at skin (cm):  15    Cath secured with: suture      Dressing:  Tegaderm and Biopatch    Complications:  None obvious    Blood aspirated all lumens: Yes      All Lumens Flushed: Yes      Verification method:  Placement to be verified post-op

## 2017-01-05 ENCOUNTER — DOCUMENTATION ONLY (OUTPATIENT)
Dept: TRANSPLANT | Facility: CLINIC | Age: 27
End: 2017-01-05

## 2017-01-05 ENCOUNTER — CARE COORDINATION (OUTPATIENT)
Dept: TRANSPLANT | Facility: CLINIC | Age: 27
End: 2017-01-05

## 2017-01-06 ENCOUNTER — DOCUMENTATION ONLY (OUTPATIENT)
Dept: TRANSPLANT | Facility: CLINIC | Age: 27
End: 2017-01-06

## 2017-01-06 NOTE — ANESTHESIA POST-OP FOLLOW-UP NOTE
REGIONAL ANESTHESIA PAIN SERVICE CONTINUOUS NERVE INFUSION NOTE  SUBJECTIVE:  Interval History: Pt reports moderate pain control via continuous peripheral nerve block (CPNB) infusion.  Denies any weakness, paresthesias, circumoral numbness, metallic taste or tinnitus.  Pt is ambulating with assistance.  Patient is currently without nausea or vomiting. Patient is tolerating a diet.  Pt reports pain decreased significantly after the bolus yesterday morning.  Pt is requesting a repeat bolus today.                Clinically Aligned Pain Assessment (CAPA):    Comfort (How is your pain?): Tolerable with discomfort  Change in Pain (Since your last medication/intervention?): About the same  Pain Control (How are your pain treatments working?):  Partially effective pain control  Functioning (Are you able to do activities to get better?) : Can do most things, but pain gets in the way of some    Sleep (Does your pain management allow you to sleep or rest?): Awake with occasional pain      Anticoagulation:  none      OBJECTIVE:    Diagnostic:  WBC     11.3   1/6/2017  RBC     4.16   1/6/2017  HGB     12.8   1/6/2017  HCT     37.8   1/6/2017  PLT      121   1/6/2017      Vitals:    Temp:  [97.8  F (36.6  C)-98.6  F (37  C)] 98.6  F (37  C)  Pulse:  [77-96] 96  Heart Rate:  [75-96] 82  Resp:  [16] 16  BP: (117-131)/(68-84) 130/79 mmHg  SpO2:  [87 %-100 %] 98 %    Exam:                Strength 5/5 and symmetric grossly in bilateral LE                            R) paravertebral (PV) catheter site with dressing c/d/i, no tenderness, erythema, heme, edema      ASSESSMENT/PLAN:     Rustam Martinez is a 26 year old male POD #2 s/p DONOR LIVER LIVING UNRELATED and placement of R) T8-9 PV catheter for analgesia.  Pt is receiving adequate analgesia with Ropivacaine 0.2%, total infusion 12mL/hour.  Pt is ambulating without difficulty.  No weakness or paresthesias.  No evidence of adverse side effects associated with local anesthetic.      - 0900 increased Ropivacaine infusion to 14mL/hour  - 0915 R) PV catheter was bolused with 5mL PF bupivacaine 0.25%  - 1250 pt reports significant reduction in pain with bolus. Pt would like to be evaluated for repeat bolus cory.  RN must page #[phone] RAPS to request bolus.  - will continue to follow and adjust as needed    Terrance Andrews MD  [facility] Service

## 2017-01-06 NOTE — PROGRESS NOTES
Visited Cb on 7A.   He was lying in bed with father at bed side.  He stated he was going to take a nap.  I thanked him for his donation again.  I gave his father 4 parking passes.  I said I would se him on Monday

## 2017-01-06 NOTE — ADDENDUM NOTE
Addendum  created 01/06/17 1357 by Terrance Andrews MD    Modules edited: Notes Section    Notes Section:  File: [id number]

## 2017-01-07 ENCOUNTER — APPOINTMENT (OUTPATIENT)
Dept: OCCUPATIONAL THERAPY | Facility: CLINIC | Age: 27
DRG: 407 | End: 2017-01-07
Attending: NURSE PRACTITIONER

## 2017-01-07 NOTE — ADDENDUM NOTE
Addendum  created 01/07/17 1617 by Simon Gaitan MD    Modules edited: Notes Section    Notes Section:  File: [id number]

## 2017-01-08 ENCOUNTER — APPOINTMENT (OUTPATIENT)
Dept: OCCUPATIONAL THERAPY | Facility: CLINIC | Age: 27
DRG: 407 | End: 2017-01-08
Attending: TRANSPLANT SURGERY

## 2017-01-08 NOTE — ADDENDUM NOTE
Addendum  created 01/08/17 0921 by Stephan Magallon MD    Modules edited: Notes Section    Notes Section:  File: [id number]

## 2017-01-08 NOTE — ADDENDUM NOTE
Addendum  created 01/08/17 1513 by Tonya Escudero MD    Modules edited: Notes Section    Notes Section:  File: [id number]

## 2017-01-08 NOTE — ANESTHESIA POST-OP FOLLOW-UP NOTE
REGIONAL ANESTHESIA PAIN SERVICE CONTINUOUS NERVE INFUSION NOTE  SUBJECTIVE:  Interval History: Pt reports moderate pain control via continuous peripheral nerve block (CPNB) infusion.  Denies any weakness, paresthesias, circumoral numbness, metallic taste or tinnitus.  Pt is ambulating with assistance.  Patient is currently without nausea or vomiting. Patient is tolerating a diet.  Pt reports pain decreased significantly after the bolus yesterday morning.  Pt is requesting a repeat bolus today.                Clinically Aligned Pain Assessment (CAPA):    Comfort (How is your pain?): Tolerable with discomfort  Change in Pain (Since your last medication/intervention?): About the same  Pain Control (How are your pain treatments working?):  Partially effective pain control  Functioning (Are you able to do activities to get better?) : Can do most things, but pain gets in the way of some    Sleep (Does your pain management allow you to sleep or rest?): Awake with occasional pain      Anticoagulation:  none      OBJECTIVE:    Diagnostic:  WBC     11.3   1/6/2017  RBC     4.16   1/6/2017  HGB     12.8   1/6/2017  HCT     37.8   1/6/2017  PLT      121   1/6/2017      Vitals:    Temp:  [36.4  C (97.5  F)-37.5  C (99.5  F)] 37.4  C (99.4  F)  Pulse:  [71-91] 91  Heart Rate:  [56-91] 91  Resp:  [16] 16  BP: (122-143)/(66-89) 143/89 mmHg  SpO2:  [94 %-99 %] 97 %    Exam:                Strength 5/5 and symmetric grossly in bilateral LE                            R) paravertebral (PV) catheter site with dressing c/d/i, no tenderness, erythema, heme, edema      ASSESSMENT/PLAN:     Rustam Martinez is a 26 year old male POD #3 s/p DONOR LIVER LIVING UNRELATED and placement of R) T8-9 PV catheter for analgesia.  Pt is receiving adequate analgesia with Ropivacaine 0.2%, total infusion 12mL/hour.  Pt is ambulating without difficulty.  No weakness or paresthesias.  No evidence of adverse side effects associated with local  anesthetic.    - Patient was bolused with .125% bupivicaine at 1200 pm    - will continue to follow and adjust as needed  - expected change of next On-Q pump is today - RN aware  Stephan Magallon MD  1/7/17

## 2017-01-09 NOTE — ANESTHESIA POST-OP FOLLOW-UP NOTE
REGIONAL ANESTHESIA PAIN SERVICE CONTINUOUS NERVE INFUSION NOTE  SUBJECTIVE:  Interval History: Pt reports adequate pain control via continuous peripheral nerve block (CPNB) infusion.  Denies any weakness, paresthesias, circumoral numbness, metallic taste or tinnitus.  Pt is ambulating with assistance.  Patient is currently without nausea or vomiting. Pain continues to improve. He is tolerating a regular diet.                 Clinically Aligned Pain Assessment (CAPA):    Comfort (How is your pain?): Tolerable with discomfort  Change in Pain (Since your last medication/intervention?): Getting better  Pain Control (How are your pain treatments working?):  Partially effective pain control  Functioning (Are you able to do activities to get better?) : Can do most things, but pain gets in the way of some    Sleep (Does your pain management allow you to sleep or rest?): Awake with occasional pain                Numerical Rating Scale:  3-4/10 at rest       Anticoagulation:  None      OBJECTIVE:    Diagnostic:  WBC      4.8   1/8/2017  RBC     3.77   1/8/2017  HGB     11.8   1/8/2017  HCT     33.3   1/8/2017  PLT      126   1/8/2017      Vitals:    Temp:  [36.6  C (97.8  F)-36.9  C (98.5  F)] 36.9  C (98.5  F)  Pulse:  [69-79] 69  Heart Rate:  [64-80] 69  Resp:  [18] 18  BP: (127-140)/(83-93) 136/93 mmHg  SpO2:  [97 %-98 %] 97 %    Exam:                Strength 5/5 and symmetric grossly in bilateral LE               Right paravertebral (PV) catheter sites with dressing c/d/i, no tenderness, erythema, heme, edema      ASSESSMENT/PLAN:     Rustam Martinez is a 26 year old male POD #4 s/p DONOR LIVER LIVING UNRELATED and placement of right T8-9 PV catheter for analgesia.  Pt is receiving adequate analgesia with ropivacaine 0.2%, total infusion 14 mL/hour (increased yesterday).  Pt is ambulating without difficulty.  No weakness or paresthesias, adequate sensory block.  No evidence of adverse side effects associated with  local anesthetic.     - continue current total infusion of 14 mL/hour  - will continue to follow and adjust as needed  Stephan Magallon MD  January 8, 2017

## 2017-01-09 NOTE — ADDENDUM NOTE
Addendum  created 01/09/17 1337 by Wolf Bush MD    Modules edited: Notes Section    Notes Section:  File: [id number]

## 2017-01-09 NOTE — ADDENDUM NOTE
Addendum  created 01/08/17 6645 by Stephan Magallon MD    Modules edited: Notes Section    Notes Section:  File: [id number]

## 2017-01-09 NOTE — ANESTHESIA POST-OP FOLLOW-UP NOTE
REGIONAL ANESTHESIA PAIN SERVICE CONTINUOUS NERVE INFUSION NOTE  SUBJECTIVE:  Interval History: Pt reports adequate pain control via continuous peripheral nerve block (CPNB) infusion and PCA HYDROmorphone.  Denies any weakness, paresthesias, circumoral numbness, metallic taste or tinnitus.  Pt is is ambulating with assistance.  Patient is currently with nausea and vomiting. Patient is tolerating a regular diet.                  Clinically Aligned Pain Assessment (CAPA):    Comfort (How is your pain?): Comfortably manageable  Change in Pain (Since your last medication/intervention?): About the same  Pain Control (How are your pain treatments working?):  Partially effective pain control  Functioning (Are you able to do activities to get better?) : Can do most things, but pain gets in the way of some    Sleep (Does your pain management allow you to sleep or rest?): Awake with occasional pain                Numerical Rating Scale:  3/10 at rest and 4/10 with movement.        Anticoagulation:  none      OBJECTIVE:    Diagnostic:  WBC      4.6   1/9/2017  RBC     3.91   1/9/2017  HGB     11.9   1/9/2017  HCT     34.8   1/9/2017  PLT      125   1/9/2017      Vitals:    Temp:  [97.4  F (36.3  C)-98.5  F (36.9  C)] 97.9  F (36.6  C)  Pulse:  [69] 69  Heart Rate:  [56-69] 66  Resp:  [15-18] 16  BP: (127-139)/(80-98) 136/80 mmHg  SpO2:  [96 %-100 %] 96 %    Exam:                Strength 5/5 and symmetric grossly in bilateral LE                R) paravertebral (PV) catheter site with dressing c/d/i, no tenderness, erythema, heme, edema      ASSESSMENT/PLAN:     Rustam Martinez is a 26 year old male POD #5 s/p DONOR LIVER LIVING UNRELATED and placement of R) T8-9 PV catheter for analgesia.  Pt is receiving adequate analgesia with Ropivacaine 0.2%, total infusion 12mL/hour.  Pt is ambulating without difficulty.  No weakness or paresthesias.  No evidence of adverse side effects associated with local anesthetic. Pt using  abdominal binder for comfort.    - continue current total infusion of 12mL/hour  - will DC PV catheter in the next 1-2 days  - will continue to follow and adjust as neededt    Wolf Bush MD  Regional Anesthesia Pain Service  January 9, 2017  1:37 PM      24 hour Job Code Pager.  For in-house use only.      [place]:  * * [phone]  [facility] [place]: * * *[phone]  Peds: * * [phone]  Enter call-back number and #       This pager only accepts text messages through [facility]

## 2017-01-10 NOTE — ANESTHESIA POST-OP FOLLOW-UP NOTE
REGIONAL ANESTHESIA PAIN SERVICE CONTINUOUS NERVE INFUSION NOTE  SUBJECTIVE:  Interval History: Pt reports good pain control via continuous peripheral nerve block (CPNB) infusion.  Denies any weakness, paresthesias, circumoral numbness, metallic taste or tinnitus.  Pt is ambulating with assistance.  Patient is currently with intermittent nausea effectively treated with Zofran. Patient is tolerating a diet.  Pt has had multiple BM's.                Clinically Aligned Pain Assessment (CAPA):    Comfort (How is your pain?): Negligible pain  Change in Pain (Since your last medication/intervention?): Getting better  Pain Control (How are your pain treatments working?):  Fully effective pain control  Functioning (Are you able to do activities to get better?) : Can do everythingI need to    Sleep (Does your pain management allow you to sleep or rest?): Normal sleep                Numerical Rating Scale:  2 on a 0/10 VAS       Anticoagulation:  none      OBJECTIVE:    Diagnostic:  WBC      4.6   1/9/2017  RBC     3.91   1/9/2017  HGB     11.9   1/9/2017  HCT     34.8   1/9/2017  PLT      125   1/9/2017      Vitals:    Temp:  [97.6  F (36.4  C)-98.5  F (36.9  C)] 97.6  F (36.4  C)  Heart Rate:  [48-67] 48  Resp:  [16] 16  BP: (111-136)/(69-98) 111/69 mmHg  SpO2:  [96 %-100 %] 99 %    Exam:                Strength 5/5 and symmetric grossly in bilateral LE                            R) paravertebral (PV) catheter site with dressing c/d/i, no tenderness, erythema, heme, edema      ASSESSMENT/PLAN:     Rustam Martinez is a 26 year old male POD #6 s/p DONOR LIVER LIVING UNRELATED and placement of R) T8-9 PV catheter for analgesia.  Pt is receiving adequate analgesia with Ropivacaine 0.2%, total infusion 14mL/hour.  Pt is ambulating without difficulty.  No weakness or paresthesias.  No evidence of adverse side effects associated with local anesthetic.  Pt is tolerating oral pain medications.    - R) PV catheter clamped at 0805     - 1020 pt reports no significant increase in pain - R) PV catheter was DC'd dark tip intact without complication POD#6  - will sign off - call for questions or concerns    Wolf Bush MD  [facility] Anest[facility] Pain Service  January 10, 2017  3:01 PM    24 hour Job Code Pager.  For in-house use only.      [place]:  * * [phone]  [place]: * * [phone]  Peds: * * [phone]  Enter call-back number and #       This pager only accepts text messages through [facility]

## 2017-01-10 NOTE — ADDENDUM NOTE
Addendum  created 01/10/17 1502 by Wolf Bush MD    Modules edited: Anesthesia Events, Narrator, Notes Section    Narrator:  Narrator: Event Log Edited    Notes Section:  File: [id number]

## 2017-01-11 ENCOUNTER — DOCUMENTATION ONLY (OUTPATIENT)
Dept: TRANSPLANT | Facility: CLINIC | Age: 27
End: 2017-01-11

## 2017-01-11 NOTE — PROGRESS NOTES
Saw Cb on 7A today. He was sitting up in the chair.  He said he was going home tomorrow.  He was in good spirits.  I reviewed some post op instructions and will review more tomorrow.  I answered all questions.

## 2017-01-12 DIAGNOSIS — Z52.6 LIVER DONOR: Primary | ICD-10-CM

## 2017-01-13 ENCOUNTER — TELEPHONE (OUTPATIENT)
Dept: TRANSPLANT | Facility: CLINIC | Age: 27
End: 2017-01-13

## 2017-01-13 ENCOUNTER — CARE COORDINATION (OUTPATIENT)
Dept: CARE COORDINATION | Facility: CLINIC | Age: 27
End: 2017-01-13

## 2017-01-13 NOTE — PROGRESS NOTES
Patient is a transplant patient and will be followed by the transplant team for follow up      Donor - Live Friend Right Liver Lobe   Last reviewed by Jael Adams on 10/25/2016    Phase: Donated (1/4/2017)   Status: Active Follow-up    Next review: 11/17/2016   POD: 9 days             Care team: Jael Adams (Yulia Coord) Self, Referred, MD (Referring Physician)        Transplant Center: [facility] ([place], [place])       Recovery admission: Admission Summary     Recovery surgery: Surgery (1/4/2017)     Forms      SOT EPISODE PROTOCOLS                     Phase history: Effective Phase Status Reason Report   1/4/2017 Donated Active Follow-up  Visit Summary   10/25/2016 Evaluation Approved Ready to Donate Visit Summary   10/17/2016 Evaluation Active Ready for Committee    10/12/2016 Evaluation Active  Visit Summary   9/22/2016 Referral Active Scheduled for Evaluation    9/16/2016 Referral Active Ready for Evaluation Visit Summary   9/12/2016 Referral Active Phase 1 Labs Pending Visit Summary   8/25/2016 Referral Denied Does Not Meet Criteria Psychosocial Visit Summary   8/23/2016 Referral Active  Visit Summary

## 2017-01-13 NOTE — TELEPHONE ENCOUNTER
Talked to Cb today.  He is doing well.  In good spirits.  Good pain control.   I gave him the numbers for after hour care.  I answered all questions.    Patient is S/P living liver donor.  Called patient to assess post-operative status.    Patient reports feeling well and incision is dry and clean.    Patient reports normal voiding patterns and normal bowel movements.  Diet is as tolerated.  (Do not worry if you are not hungry - it will come in time.  Stay hydrated.)  Pain is well controlled?  (If it hurts, do not do it.)  Taking narcotics?  (Take narcotics every 4-6 hours if needed.  Start switching to Tylenol during the day if you can & if needed, take narcotics before bed to get a good nights sleep.)  Patient was encouraged to call coordinator during daytime hours or triage line after hours if any problems or concerns arise.

## 2017-01-17 ENCOUNTER — TELEPHONE (OUTPATIENT)
Dept: TRANSPLANT | Facility: CLINIC | Age: 27
End: 2017-01-17

## 2017-01-17 NOTE — TELEPHONE ENCOUNTER
I called Cb to let him know about his appt on Friday at 9:30  Would like him to come at 9 am for labs.  He is feeling well.    Patient is S/P living liverdonor.  Called patient to assess post-operative status.    Patient reports feeling welland incision is dry and clean with glue peeling off..    Patient reports normal voiding patterns and normal bowel movements.  Diet is as tolerated.  (Do not worry if you are not hungry - it will come in time.  Stay hydrated.)  Pain is well controlled?  (If it hurts, do not do it.)  Taking narcotics?  (Take narcotics every 4-6 hours if needed.  Start switching to Tylenol during the day if you can & if needed, take narcotics before bed to get a good nights sleep.)  Patient was encouraged to call coordinator during daytime hours or triage line after hours if any problems or concerns arise.

## 2017-01-20 ENCOUNTER — OFFICE VISIT (OUTPATIENT)
Dept: TRANSPLANT | Facility: CLINIC | Age: 27
End: 2017-01-20
Attending: TRANSPLANT SURGERY

## 2017-01-20 VITALS
DIASTOLIC BLOOD PRESSURE: 81 MMHG | OXYGEN SATURATION: 100 % | RESPIRATION RATE: 16 BRPM | TEMPERATURE: 98.4 F | SYSTOLIC BLOOD PRESSURE: 125 MMHG | HEART RATE: 63 BPM

## 2017-01-20 DIAGNOSIS — Z52.6 DONOR FOR LIVER TRANSPLANT: ICD-10-CM

## 2017-01-20 DIAGNOSIS — Z52.6 LIVER DONOR: ICD-10-CM

## 2017-01-20 DIAGNOSIS — Z52.6 LIVER DONOR: Primary | ICD-10-CM

## 2017-01-20 LAB
ALP SERPL-CCNC: 98 U/L (ref 40–150)
BILIRUB SERPL-MCNC: 1.7 MG/DL (ref 0.2–1.3)
ERYTHROCYTE [DISTWIDTH] IN BLOOD BY AUTOMATED COUNT: 12.3 % (ref 10–15)
HCT VFR BLD AUTO: 41.2 % (ref 40–53)
HGB BLD-MCNC: 14.4 G/DL (ref 13.3–17.7)
INR PPP: 1.08 (ref 0.86–1.14)
MCH RBC QN AUTO: 31.8 PG (ref 26.5–33)
MCHC RBC AUTO-ENTMCNC: 35 G/DL (ref 31.5–36.5)
MCV RBC AUTO: 91 FL (ref 78–100)
PLATELET # BLD AUTO: 276 10E9/L (ref 150–450)
RBC # BLD AUTO: 4.53 10E12/L (ref 4.4–5.9)
WBC # BLD AUTO: 5.9 10E9/L (ref 4–11)

## 2017-01-20 PROCEDURE — 85027 COMPLETE CBC AUTOMATED: CPT | Performed by: TRANSPLANT SURGERY

## 2017-01-20 PROCEDURE — 80053 COMPREHEN METABOLIC PANEL: CPT | Performed by: TRANSPLANT SURGERY

## 2017-01-20 PROCEDURE — 36415 COLL VENOUS BLD VENIPUNCTURE: CPT | Performed by: TRANSPLANT SURGERY

## 2017-01-20 PROCEDURE — 82977 ASSAY OF GGT: CPT | Performed by: TRANSPLANT SURGERY

## 2017-01-20 PROCEDURE — 85610 PROTHROMBIN TIME: CPT | Performed by: TRANSPLANT SURGERY

## 2017-01-20 RX ORDER — URSODIOL 300 MG/1
300 CAPSULE ORAL 3 TIMES DAILY
Qty: 90 CAPSULE | Refills: 0 | Status: SHIPPED | OUTPATIENT
Start: 2017-01-20

## 2017-01-20 RX ORDER — TRAMADOL HYDROCHLORIDE 50 MG/1
100 TABLET ORAL EVERY 6 HOURS PRN
Qty: 60 TABLET | Refills: 0 | Status: SHIPPED | OUTPATIENT
Start: 2017-01-20

## 2017-01-20 RX ORDER — ERGOCALCIFEROL (VITAMIN D2) 10 MCG
400 TABLET ORAL PRN
Qty: 60 TABLET | Refills: 3 | Status: SHIPPED | OUTPATIENT
Start: 2017-01-20

## 2017-01-20 NOTE — NURSING NOTE
"Chief Complaint   Patient presents with     Transplant Donor Post Donation     POD 16       Initial /81 mmHg  Pulse 63  Temp(Src) 98.4  F (36.9  C) (Oral)  Resp 16  SpO2 100% Estimated body mass index is 20.92 kg/(m^2) as calculated from the following:    Height as of 1/4/17: 1.88 m (6' 2\").    Weight as of 1/3/17: 73.936 kg (163 lb).  BP completed using cuff size: regular    "

## 2017-01-20 NOTE — PROGRESS NOTES
Liver donor follow up:  Doing well, no fevers or abdominal pain, no itching or dark colored urine.  Pain well controlled and takes pain pills only at night    Past Medical History   Diagnosis Date     NO ACTIVE PROBLEMS      Past Surgical History   Procedure Laterality Date     Dental surgery       [place] teeth removal     Donor liver living unrelated N/A 1/4/2017     Procedure: DONOR LIVER LIVING UNRELATED;  Surgeon: Jose Merchant MD;  Location:  OR     Past Surgical History   Procedure Laterality Date     Dental surgery       [place] teeth removal     Donor liver living unrelated N/A 1/4/2017     Procedure: DONOR LIVER LIVING UNRELATED;  Surgeon: Jose Merchant MD;  Location: [facility] OR       Patient Active Problem List   Diagnosis     NO ACTIVE PROBLEMS     Donor for liver transplant       Current Outpatient Prescriptions   Medication Sig Dispense Refill     traMADol (ULTRAM) 50 MG tablet Take 2 tablets (100 mg) by mouth every 6 hours as needed for moderate pain 60 tablet 0     senna-docusate (SENOKOT-S;PERICOLACE) 8.6-50 MG per tablet Take 2 tablets by mouth 2 times daily for 10 days 40 tablet 0     ursodiol (ACTIGALL) 300 MG capsule Take 1 capsule (300 mg) by mouth 3 times daily 90 capsule 0     Vitamin D, Cholecalciferol, 400 UNITS TABS Take 400 Units by mouth as needed (seasonal)          No Known Allergies    Filed Vitals:    01/20/17 1428   BP: 125/81   Pulse: 63   Temp: 98.4  F (36.9  C)   TempSrc: Oral   Resp: 16   SpO2: 100%       Abdomen: wound clean  Chest air entry good.    Labs pending, bilirubin is mildly elevated 1.7 would recommend US to look for biliary dilatation if bilirubin increases  and repeat labs in a week    Continue actigall and tramadol for pain

## 2017-01-20 NOTE — Clinical Note
1/20/2017       RE: Rustam Martinez  [address]  [facility] [address]     Dear Colleague,    Thank you for referring your patient, Rustam Martinez, to the [facility] SOLID ORGAN TRANSPLANT at [facility]. Please see a copy of my visit note below.    Liver donor follow up:  Doing well, no fevers or abdominal pain, no itching or dark colored urine.  Pain well controlled and takes pain pills only at night    Past Medical History   Diagnosis Date     NO ACTIVE PROBLEMS      Past Surgical History   Procedure Laterality Date     Dental surgery       [place] teeth removal     Donor liver living unrelated N/A 1/4/2017     Procedure: DONOR LIVER LIVING UNRELATED;  Surgeon: Jose Merchant MD;  Location: [facility] OR     Past Surgical History   Procedure Laterality Date     Dental surgery       [place] teeth removal     Donor liver living unrelated N/A 1/4/2017     Procedure: DONOR LIVER LIVING UNRELATED;  Surgeon: Jose Merchant MD;  Location: [facility] OR       Patient Active Problem List   Diagnosis     NO ACTIVE PROBLEMS     Donor for liver transplant       Current Outpatient Prescriptions   Medication Sig Dispense Refill     traMADol (ULTRAM) 50 MG tablet Take 2 tablets (100 mg) by mouth every 6 hours as needed for moderate pain 60 tablet 0     senna-docusate (SENOKOT-S;PERICOLACE) 8.6-50 MG per tablet Take 2 tablets by mouth 2 times daily for 10 days 40 tablet 0     ursodiol (ACTIGALL) 300 MG capsule Take 1 capsule (300 mg) by mouth 3 times daily 90 capsule 0     Vitamin D, Cholecalciferol, 400 UNITS TABS Take 400 Units by mouth as needed (seasonal)          No Known Allergies    Filed Vitals:    01/20/17 1428   BP: 125/81   Pulse: 63   Temp: 98.4  F (36.9  C)   TempSrc: Oral   Resp: 16   SpO2: 100%       Abdomen: wound clean  Chest air entry good.    Labs pending, bilirubin is mildly elevated 1.7 would recommend US to look for biliary dilatation if bilirubin increases  and  repeat labs in a week    Continue actigall and tramadol for pain     Again, thank you for allowing me to participate in the care of your patient.      Sincerely,    Jose Merchant MD

## 2017-01-21 LAB
ALBUMIN SERPL-MCNC: 3.7 G/DL (ref 3.4–5)
ALP SERPL-CCNC: 100 U/L (ref 40–150)
ALT SERPL W P-5'-P-CCNC: 60 U/L (ref 0–70)
ANION GAP SERPL CALCULATED.3IONS-SCNC: 9 MMOL/L (ref 3–14)
AST SERPL W P-5'-P-CCNC: 23 U/L (ref 0–45)
BILIRUB SERPL-MCNC: 0.5 MG/DL (ref 0.2–1.3)
BUN SERPL-MCNC: 10 MG/DL (ref 7–30)
CALCIUM SERPL-MCNC: 8.6 MG/DL (ref 8.5–10.1)
CHLORIDE SERPL-SCNC: 105 MMOL/L (ref 94–109)
CO2 SERPL-SCNC: 27 MMOL/L (ref 20–32)
CREAT SERPL-MCNC: 0.93 MG/DL (ref 0.66–1.25)
GFR SERPL CREATININE-BSD FRML MDRD: NORMAL ML/MIN/1.7M2
GGT SERPL-CCNC: 60 U/L (ref 0–75)
GLUCOSE SERPL-MCNC: 94 MG/DL (ref 70–99)
POTASSIUM SERPL-SCNC: 4.1 MMOL/L (ref 3.4–5.3)
PROT SERPL-MCNC: 7.2 G/DL (ref 6.8–8.8)
SODIUM SERPL-SCNC: 141 MMOL/L (ref 133–144)

## 2017-01-25 ENCOUNTER — TELEPHONE (OUTPATIENT)
Dept: TRANSPLANT | Facility: CLINIC | Age: 27
End: 2017-01-25

## 2017-01-25 NOTE — TELEPHONE ENCOUNTER
Called Cb to see how he is doing. He said he is great. Minimal pain, no fever.  Up walking.  I let Cb know that Choco Murrell would like him to have labs done next week.   Cb is going to travel home form [place] on Monday.  He will come for a lab appt on Tuesday 1-31.  He still has a stitch where drain  Was.  I will ask Dr. HURTADO if Tamra GUAJARDO can take out stitch on Tuesday.

## 2017-01-31 ENCOUNTER — OFFICE VISIT (OUTPATIENT)
Dept: TRANSPLANT | Facility: CLINIC | Age: 27
End: 2017-01-31
Attending: TRANSPLANT SURGERY

## 2017-01-31 DIAGNOSIS — Z52.6 LIVER DONOR: ICD-10-CM

## 2017-01-31 DIAGNOSIS — Z52.6 DONOR FOR LIVER TRANSPLANT: Primary | ICD-10-CM

## 2017-01-31 LAB
ALBUMIN SERPL-MCNC: 3.9 G/DL (ref 3.4–5)
ALP SERPL-CCNC: 82 U/L (ref 40–150)
ALT SERPL W P-5'-P-CCNC: 69 U/L (ref 0–70)
ANION GAP SERPL CALCULATED.3IONS-SCNC: 7 MMOL/L (ref 3–14)
AST SERPL W P-5'-P-CCNC: 26 U/L (ref 0–45)
BILIRUB SERPL-MCNC: 0.9 MG/DL (ref 0.2–1.3)
BUN SERPL-MCNC: 11 MG/DL (ref 7–30)
CALCIUM SERPL-MCNC: 8.8 MG/DL (ref 8.5–10.1)
CHLORIDE SERPL-SCNC: 103 MMOL/L (ref 94–109)
CO2 SERPL-SCNC: 30 MMOL/L (ref 20–32)
CREAT SERPL-MCNC: 0.84 MG/DL (ref 0.66–1.25)
GFR SERPL CREATININE-BSD FRML MDRD: NORMAL ML/MIN/1.7M2
GGT SERPL-CCNC: 62 U/L (ref 0–75)
GLUCOSE SERPL-MCNC: 82 MG/DL (ref 70–99)
POTASSIUM SERPL-SCNC: 3.8 MMOL/L (ref 3.4–5.3)
PROT SERPL-MCNC: 7.3 G/DL (ref 6.8–8.8)
SODIUM SERPL-SCNC: 140 MMOL/L (ref 133–144)

## 2017-01-31 PROCEDURE — 99211 OFF/OP EST MAY X REQ PHY/QHP: CPT | Mod: ZF

## 2017-01-31 PROCEDURE — 80053 COMPREHEN METABOLIC PANEL: CPT | Performed by: TRANSPLANT SURGERY

## 2017-01-31 PROCEDURE — 82977 ASSAY OF GGT: CPT | Performed by: TRANSPLANT SURGERY

## 2017-01-31 PROCEDURE — 36415 COLL VENOUS BLD VENIPUNCTURE: CPT | Performed by: TRANSPLANT SURGERY

## 2017-01-31 NOTE — Clinical Note
1/31/2017       RE: Rustam Martinez  [address]  [facility] [address]     Dear Colleague,    Thank you for referring your patient, Rustam Martinez, to the [facility] SOLID ORGAN TRANSPLANT at [facility]. Please see a copy of my visit note below.    Transplant Surgery Progress Note    Medical record number: [id number]  YOB: 1990,   Date of Visit:  01/31/2017    S:  Patient is a 26 year old male who was a living liver donor.    Patient presents today to have his stitch from a KATHI drain removed. Patient states he feels well. He has no nausea, vomiting, fever, chils.  He is not constipated.  He is eating and sleeping well.  Transplant History:  Transplant: 1/4/2017 (Liver)   Donor Type:  Liver donor       Transplant Coordinator:      Transplant Office Phone Number: [phone]   O: Vitals: There were no vitals taken for this visit.  BMI= There is no weight on file to calculate BMI.  Focussed exam:    Cardiac: RRR, normal S1, S2  Lungs: Clear throughout  Abdomen: flat, soft non distended. Bowel sounds active in all for quadrants. Incision is healing well. No erythema noted.    Small stitch removed without complication from right lower abdomen. Steri strip placed over wound      A/P:   1. Liver donor: Patient is healing well. Labs are stable and have trended down. Patient educated about weight restrictions. Patient states understanding.  Reviewed signs and symptoms of infections, fever, drainage, chills.      Patient states understanding      Total time: 15 minutes  Counselling time: 10 minutes      Again, thank you for allowing me to participate in the care of your patient.      Sincerely,    Tamra Sainz NP

## 2017-01-31 NOTE — PROGRESS NOTES
Transplant Surgery Progress Note    Medical record number: [id number]  YOB: 1990,   Date of Visit:  01/31/2017    S:  Patient is a 26 year old male who was a living liver donor.    Patient presents today to have his stitch from a KATHI drain removed. Patient states he feels well. He has no nausea, vomiting, fever, chils.  He is not constipated.  He is eating and sleeping well.  Transplant History:  Transplant: 1/4/2017 (Liver)   Donor Type:  Liver donor       Transplant Coordinator:      Transplant Office Phone Number: [phone]   O: Vitals: There were no vitals taken for this visit.  BMI= There is no weight on file to calculate BMI.  Focussed exam:    Cardiac: RRR, normal S1, S2  Lungs: Clear throughout  Abdomen: flat, soft non distended. Bowel sounds active in all for quadrants. Incision is healing well. No erythema noted.    Small stitch removed without complication from right lower abdomen. Steri strip placed over wound      A/P:   1. Liver donor: Patient is healing well. Labs are stable and have trended down. Patient educated about weight restrictions. Patient states understanding.  Reviewed signs and symptoms of infections, fever, drainage, chills.      Patient states understanding      Total time: 15 minutes  Counselling time: 10 minutes

## 2017-02-06 ENCOUNTER — TELEPHONE (OUTPATIENT)
Dept: TRANSPLANT | Facility: CLINIC | Age: 27
End: 2017-02-06

## 2017-02-06 DIAGNOSIS — Z52.6 LIVER DONOR: Primary | ICD-10-CM

## 2017-02-06 NOTE — TELEPHONE ENCOUNTER
Cb,  your last labs looked great.  Dr. HURTADO would like labs in March.  Let me know when it would work for you to come in for labs.  I can then make the appt and put orders in.  Hope you are doing well.   Jael

## 2017-03-20 ENCOUNTER — OFFICE VISIT (OUTPATIENT)
Dept: TRANSPLANT | Facility: CLINIC | Age: 27
End: 2017-03-20
Attending: TRANSPLANT SURGERY

## 2017-03-20 VITALS
HEIGHT: 74 IN | HEART RATE: 60 BPM | SYSTOLIC BLOOD PRESSURE: 144 MMHG | BODY MASS INDEX: 20.57 KG/M2 | TEMPERATURE: 98.3 F | WEIGHT: 160.3 LBS | DIASTOLIC BLOOD PRESSURE: 94 MMHG

## 2017-03-20 DIAGNOSIS — Z52.6 LIVER DONOR: Primary | ICD-10-CM

## 2017-03-20 DIAGNOSIS — Z52.6 LIVER DONOR: ICD-10-CM

## 2017-03-20 LAB
ALP SERPL-CCNC: 69 U/L (ref 40–150)
ALT SERPL W P-5'-P-CCNC: 26 U/L (ref 0–70)
AST SERPL W P-5'-P-CCNC: 15 U/L (ref 0–45)
BILIRUB SERPL-MCNC: 1.5 MG/DL (ref 0.2–1.3)
ERYTHROCYTE [DISTWIDTH] IN BLOOD BY AUTOMATED COUNT: 12.1 % (ref 10–15)
HCT VFR BLD AUTO: 45.3 % (ref 40–53)
HGB BLD-MCNC: 15.9 G/DL (ref 13.3–17.7)
INR PPP: 1.06 (ref 0.86–1.14)
MCH RBC QN AUTO: 31 PG (ref 26.5–33)
MCHC RBC AUTO-ENTMCNC: 35.1 G/DL (ref 31.5–36.5)
MCV RBC AUTO: 88 FL (ref 78–100)
PLATELET # BLD AUTO: 198 10E9/L (ref 150–450)
RBC # BLD AUTO: 5.13 10E12/L (ref 4.4–5.9)
WBC # BLD AUTO: 4.8 10E9/L (ref 4–11)

## 2017-03-20 PROCEDURE — 84460 ALANINE AMINO (ALT) (SGPT): CPT | Performed by: TRANSPLANT SURGERY

## 2017-03-20 PROCEDURE — 82247 BILIRUBIN TOTAL: CPT | Performed by: TRANSPLANT SURGERY

## 2017-03-20 PROCEDURE — 36415 COLL VENOUS BLD VENIPUNCTURE: CPT | Performed by: TRANSPLANT SURGERY

## 2017-03-20 PROCEDURE — 84450 TRANSFERASE (AST) (SGOT): CPT | Performed by: TRANSPLANT SURGERY

## 2017-03-20 PROCEDURE — 84075 ASSAY ALKALINE PHOSPHATASE: CPT | Performed by: TRANSPLANT SURGERY

## 2017-03-20 PROCEDURE — 85027 COMPLETE CBC AUTOMATED: CPT | Performed by: TRANSPLANT SURGERY

## 2017-03-20 PROCEDURE — 85610 PROTHROMBIN TIME: CPT | Performed by: TRANSPLANT SURGERY

## 2017-03-20 RX ORDER — URSODIOL 300 MG/1
300 CAPSULE ORAL 2 TIMES DAILY
Qty: 60 CAPSULE | Refills: 1 | Status: SHIPPED | OUTPATIENT
Start: 2017-03-20

## 2017-03-20 ASSESSMENT — PAIN SCALES - GENERAL: PAINLEVEL: NO PAIN (1)

## 2017-03-20 NOTE — NURSING NOTE
"Chief Complaint   Patient presents with     Surgical Followup     liver donor 1/04/2017       Initial Pulse 60  Temp 98.3  F (36.8  C) (Oral)  Ht 1.88 m (6' 2\")  Wt 72.7 kg (160 lb 4.8 oz)  BMI 20.58 kg/m2 Estimated body mass index is 20.58 kg/(m^2) as calculated from the following:    Height as of this encounter: 1.88 m (6' 2\").    Weight as of this encounter: 72.7 kg (160 lb 4.8 oz).  Medication Reconciliation: complete    "

## 2017-03-20 NOTE — PROGRESS NOTES
"HPI      ROS      Physical Exam    Liver donor follow up:    Doing well, no fever, eating well.  Sleep is good, back to work , has ocassional fatigue    Past Medical History   Diagnosis Date     NO ACTIVE PROBLEMS      Past Surgical History   Procedure Laterality Date     Dental surgery       [place] teeth removal     Donor liver living unrelated N/A 1/4/2017     Procedure: DONOR LIVER LIVING UNRELATED;  Surgeon: Jose Merchant MD;  Location:  OR     Past Surgical History   Procedure Laterality Date     Dental surgery       [place] teeth removal     Donor liver living unrelated N/A 1/4/2017     Procedure: DONOR LIVER LIVING UNRELATED;  Surgeon: Jose Merchant MD;  Location: [facility] OR       Patient Active Problem List   Diagnosis     NO ACTIVE PROBLEMS     Donor for liver transplant       Current Outpatient Prescriptions   Medication Sig Dispense Refill     ursodiol (ACTIGALL) 300 MG capsule Take 1 capsule (300 mg) by mouth 3 times daily 90 capsule 0     Vitamin D, Cholecalciferol, 400 UNITS TABS Take 400 Units by mouth as needed (seasonal) 60 tablet 3     traMADol (ULTRAM) 50 MG tablet Take 2 tablets (100 mg) by mouth every 6 hours as needed for moderate pain (Patient not taking: Reported on 3/20/2017) 60 tablet 0        No Known Allergies    Vitals:    03/20/17 1042   BP: (!) 144/94   Pulse: 60   Temp: 98.3  F (36.8  C)   TempSrc: Oral   Weight: 72.7 kg (160 lb 4.8 oz)   Height: 1.88 m (6' 2\")     Abdomen  Exam:  Soft wound healed well  RS clear    Impression: healthy donor; doing well, review in 3 months  Keep and eye on the blood pressure; check blood pressure in 1 month  "

## 2017-03-20 NOTE — MR AVS SNAPSHOT
"              After Visit Summary   3/20/2017    Rustam Martienz    MRN: [id number]           Patient Information     Date Of Birth          1990        Visit Information        Provider Department      3/20/2017 10:30 AM Jose Merchant MD [facility] Solid Organ Transplant        Today's Diagnoses     Liver donor    -  1       Follow-ups after your visit        Who to contact     If you have questions or need follow up information about today's clinic visit or your schedule please contact [facility] SOLID ORGAN TRANSPLANT directly at [phone].  Normal or non-critical lab and imaging results will be communicated to you by [employer], letter or phone within 4 business days after the clinic has received the results. If you do not hear from us within 7 days, please contact the clinic through [employer] or phone. If you have a critical or abnormal lab result, we will notify you by phone as soon as possible.  Submit refill requests through [employer] or call your pharmacy and they will forward the refill request to us. Please allow 3 business days for your refill to be completed.          Additional Information About Your Visit        MyChart Information     [employer] gives you secure access to your electronic health record. If you see a primary care provider, you can also send messages to your care team and make appointments. If you have questions, please call your primary care clinic.  If you do not have a primary care provider, please call [phone] and they will assist you.        Care EveryWhere ID     This is your Care EveryWhere ID. This could be used by other organizations to access your [place] medical records  [id number]        Your Vitals Were     Pulse Temperature Height BMI (Body Mass Index)          60 98.3  F (36.8  C) (Oral) 1.88 m (6' 2\") 20.58 kg/m2         Blood Pressure from Last 3 Encounters:   No data found for BP    Weight from Last 3 Encounters:   No data found for Wt            "   Today, you had the following     No orders found for display         Today's Medication Changes          These changes are accurate as of: 3/20/17 11:59 PM.  If you have any questions, ask your nurse or doctor.               These medicines have changed or have updated prescriptions.        Dose/Directions    * ursodiol 300 MG capsule   Commonly known as:  ACTIGALL   This may have changed:  Another medication with the same name was added. Make sure you understand how and when to take each.   Used for:  Liver donor, Donor for liver transplant   Changed by:  Jose Merchant MD        Dose:  300 mg   Take 1 capsule (300 mg) by mouth 3 times daily   Quantity:  90 capsule   Refills:  0       * ursodiol 300 MG capsule   Commonly known as:  ACTIGALL   This may have changed:  You were already taking a medication with the same name, and this prescription was added. Make sure you understand how and when to take each.   Used for:  Liver donor   Changed by:  Jose Merchant MD        Dose:  300 mg   Take 1 capsule (300 mg) by mouth 2 times daily   Quantity:  60 capsule   Refills:  1       * Notice:  This list has 2 medication(s) that are the same as other medications prescribed for you. Read the directions carefully, and ask your doctor or other care provider to review them with you.         Where to get your medicines      These medications were sent to [address] [address]    Hours:  TRANSPLANT PHONE NUMBER [phone] Phone:  [phone]     ursodiol 300 MG capsule                Primary Care Provider Office Phone # Fax #    [facility] [phone] [address]  [facility] [address]        Thank you!     Thank you for choosing [facility] SOLID ORGAN TRANSPLANT  for your care. Our goal is always to provide you with excellent care. Hearing back from our patients is one way  we can continue to improve our services. Please take a few minutes to complete the written survey that you may receive in the mail after your visit with us. Thank you!             Your Updated Medication List - Protect others around you: Learn how to safely use, store and throw away your medicines at www.disposemymeds.org.          This list is accurate as of: 3/20/17 11:59 PM.  Always use your most recent med list.                   Brand Name Dispense Instructions for use    traMADol 50 MG tablet    ULTRAM    60 tablet    Take 2 tablets (100 mg) by mouth every 6 hours as needed for moderate pain       * ursodiol 300 MG capsule    ACTIGALL    90 capsule    Take 1 capsule (300 mg) by mouth 3 times daily       * ursodiol 300 MG capsule    ACTIGALL    60 capsule    Take 1 capsule (300 mg) by mouth 2 times daily       Vitamin D (Cholecalciferol) 400 UNITS Tabs     60 tablet    Take 400 Units by mouth as needed (seasonal)       * Notice:  This list has 2 medication(s) that are the same as other medications prescribed for you. Read the directions carefully, and ask your doctor or other care provider to review them with you.

## 2017-03-20 NOTE — LETTER
"3/20/2017     RE: Rustam Martinez  [address]  [facility] [address]     Dear Colleague,    Thank you for referring your patient, Rustam Martinez, to the [facility] SOLID ORGAN TRANSPLANT at [facility]. Please see a copy of my visit note below.    Liver donor follow up:    Doing well, no fever, eating well.  Sleep is good, back to work , has ocassional fatigue    Past Medical History   Diagnosis Date     NO ACTIVE PROBLEMS      Past Surgical History   Procedure Laterality Date     Dental surgery       [place] teeth removal     Donor liver living unrelated N/A 1/4/2017     Procedure: DONOR LIVER LIVING UNRELATED;  Surgeon: Jose Merchant MD;  Location: [facility] OR     Past Surgical History   Procedure Laterality Date     Dental surgery       [place] teeth removal     Donor liver living unrelated N/A 1/4/2017     Procedure: DONOR LIVER LIVING UNRELATED;  Surgeon: Jose Merchant MD;  Location: [facility] OR       Patient Active Problem List   Diagnosis     NO ACTIVE PROBLEMS     Donor for liver transplant       Current Outpatient Prescriptions   Medication Sig Dispense Refill     ursodiol (ACTIGALL) 300 MG capsule Take 1 capsule (300 mg) by mouth 3 times daily 90 capsule 0     Vitamin D, Cholecalciferol, 400 UNITS TABS Take 400 Units by mouth as needed (seasonal) 60 tablet 3     traMADol (ULTRAM) 50 MG tablet Take 2 tablets (100 mg) by mouth every 6 hours as needed for moderate pain (Patient not taking: Reported on 3/20/2017) 60 tablet 0        No Known Allergies    Vitals:    03/20/17 1042   BP: (!) 144/94   Pulse: 60   Temp: 98.3  F (36.8  C)   TempSrc: Oral   Weight: 72.7 kg (160 lb 4.8 oz)   Height: 1.88 m (6' 2\")     Abdomen  Exam:  Soft wound healed well  RS clear    Impression: healthy donor; doing well, review in 3 months  Keep and eye on the blood pressure; check blood pressure in 1 month    Again, thank you for allowing me to participate in the care of your " patient.      Sincerely,    Jose Merchant MD

## 2017-06-14 ENCOUNTER — TELEPHONE (OUTPATIENT)
Dept: TRANSPLANT | Facility: CLINIC | Age: 27
End: 2017-06-14

## 2017-08-23 DIAGNOSIS — Z52.6 LIVER DONOR: Primary | ICD-10-CM

## 2017-09-08 DIAGNOSIS — Z52.6 LIVER DONOR: ICD-10-CM

## 2017-09-08 LAB
ALP SERPL-CCNC: 76 U/L (ref 40–150)
BILIRUB SERPL-MCNC: 1.4 MG/DL (ref 0.2–1.3)
ERYTHROCYTE [DISTWIDTH] IN BLOOD BY AUTOMATED COUNT: 11.9 % (ref 10–15)
HCT VFR BLD AUTO: 43.4 % (ref 40–53)
HGB BLD-MCNC: 15.4 G/DL (ref 13.3–17.7)
INR PPP: 1.04 (ref 0.86–1.14)
MCH RBC QN AUTO: 32 PG (ref 26.5–33)
MCHC RBC AUTO-ENTMCNC: 35.5 G/DL (ref 31.5–36.5)
MCV RBC AUTO: 90 FL (ref 78–100)
PLATELET # BLD AUTO: 191 10E9/L (ref 150–450)
RBC # BLD AUTO: 4.81 10E12/L (ref 4.4–5.9)
WBC # BLD AUTO: 6.2 10E9/L (ref 4–11)

## 2018-04-23 ENCOUNTER — TELEPHONE (OUTPATIENT)
Dept: TRANSPLANT | Facility: CLINIC | Age: 28
End: 2018-04-23

## 2018-04-23 NOTE — TELEPHONE ENCOUNTER
Cb called today asking for documentation stating he donated part of his liver.   He is being audited by the IRS for claiming $8000.00 in lost wages. I will send him a letter to verify that he donated and his op record.

## 2018-04-23 NOTE — TELEPHONE ENCOUNTER
I left a voice mail message for Cb today to talk to him about his lost wages in regards to a current IRS audit he is experiencing.  I asked him to call or e-mail me back.    EMMA Adkins, [facility]  Clinical  and Independent Donor Advocate  [facility]  Pager:  [phone]  Direct:  [phone]

## 2020-03-10 ENCOUNTER — HEALTH MAINTENANCE LETTER (OUTPATIENT)
Age: 30
End: 2020-03-10

## 2020-12-27 ENCOUNTER — HEALTH MAINTENANCE LETTER (OUTPATIENT)
Age: 30
End: 2020-12-27

## 2021-04-24 ENCOUNTER — HEALTH MAINTENANCE LETTER (OUTPATIENT)
Age: 31
End: 2021-04-24

## 2021-10-09 ENCOUNTER — HEALTH MAINTENANCE LETTER (OUTPATIENT)
Age: 31
End: 2021-10-09

## 2022-05-16 ENCOUNTER — HEALTH MAINTENANCE LETTER (OUTPATIENT)
Age: 32
End: 2022-05-16

## 2022-09-11 ENCOUNTER — HEALTH MAINTENANCE LETTER (OUTPATIENT)
Age: 32
End: 2022-09-11

## 2023-06-03 ENCOUNTER — HEALTH MAINTENANCE LETTER (OUTPATIENT)
Age: 33
End: 2023-06-03